# Patient Record
Sex: FEMALE | Race: OTHER | URBAN - METROPOLITAN AREA
[De-identification: names, ages, dates, MRNs, and addresses within clinical notes are randomized per-mention and may not be internally consistent; named-entity substitution may affect disease eponyms.]

---

## 2023-07-28 ENCOUNTER — HOSPITAL ENCOUNTER (INPATIENT)
Age: 38
LOS: 5 days | Discharge: HOME | DRG: 812 | End: 2023-08-02
Attending: EMERGENCY MEDICINE | Admitting: FAMILY MEDICINE

## 2023-07-28 ENCOUNTER — APPOINTMENT (OUTPATIENT)
Dept: CT IMAGING | Age: 38
DRG: 812 | End: 2023-07-28

## 2023-07-28 ENCOUNTER — APPOINTMENT (OUTPATIENT)
Dept: ULTRASOUND IMAGING | Age: 38
DRG: 812 | End: 2023-07-28

## 2023-07-28 DIAGNOSIS — R19.5 OCCULT BLOOD IN STOOLS: ICD-10-CM

## 2023-07-28 DIAGNOSIS — D64.9 ANEMIA, UNSPECIFIED TYPE: Primary | ICD-10-CM

## 2023-07-28 DIAGNOSIS — N76.0 BACTERIAL VAGINOSIS: ICD-10-CM

## 2023-07-28 DIAGNOSIS — B96.89 BACTERIAL VAGINOSIS: ICD-10-CM

## 2023-07-28 DIAGNOSIS — B37.31 VAGINAL YEAST INFECTION: ICD-10-CM

## 2023-07-28 DIAGNOSIS — N30.00 ACUTE CYSTITIS WITHOUT HEMATURIA: ICD-10-CM

## 2023-07-28 LAB
ALBUMIN SERPL-MCNC: 4 G/DL (ref 3.5–5)
ALBUMIN/GLOB SERPL: 0.9 (ref 0.4–1.6)
ALP SERPL-CCNC: 92 U/L (ref 50–136)
ALT SERPL-CCNC: 20 U/L (ref 12–65)
ANION GAP SERPL CALC-SCNC: 5 MMOL/L (ref 2–11)
APPEARANCE UR: CLEAR
AST SERPL-CCNC: 26 U/L (ref 15–37)
BACTERIA URNS QL MICRO: 0 /HPF
BASOPHILS # BLD: 0 K/UL (ref 0–0.2)
BASOPHILS NFR BLD: 1 % (ref 0–2)
BILIRUB SERPL-MCNC: 0.3 MG/DL (ref 0.2–1.1)
BILIRUB UR QL: NEGATIVE
BILIRUB UR QL: NEGATIVE
BUN SERPL-MCNC: 9 MG/DL (ref 6–23)
CALCIUM SERPL-MCNC: 9.5 MG/DL (ref 8.3–10.4)
CASTS URNS QL MICRO: 0 /LPF
CHLORIDE SERPL-SCNC: 108 MMOL/L (ref 101–110)
CO2 SERPL-SCNC: 27 MMOL/L (ref 21–32)
COLOR UR: ABNORMAL
CREAT SERPL-MCNC: 0.7 MG/DL (ref 0.6–1)
CRYSTALS URNS QL MICRO: 0 /LPF
DIFFERENTIAL METHOD BLD: ABNORMAL
EOSINOPHIL # BLD: 0.1 K/UL (ref 0–0.8)
EOSINOPHIL NFR BLD: 2 % (ref 0.5–7.8)
EPI CELLS #/AREA URNS HPF: ABNORMAL /HPF
ERYTHROCYTE [DISTWIDTH] IN BLOOD BY AUTOMATED COUNT: 18.1 % (ref 11.9–14.6)
FERRITIN SERPL-MCNC: 1 NG/ML (ref 8–388)
FOLATE SERPL-MCNC: 18.2 NG/ML (ref 3.1–17.5)
GLOBULIN SER CALC-MCNC: 4.6 G/DL (ref 2.8–4.5)
GLUCOSE SERPL-MCNC: 84 MG/DL (ref 65–100)
GLUCOSE UR QL STRIP.AUTO: NEGATIVE MG/DL
GLUCOSE UR STRIP.AUTO-MCNC: NEGATIVE MG/DL
HCG UR QL: NEGATIVE
HCT VFR BLD AUTO: 29.3 % (ref 35.8–46.3)
HGB BLD-MCNC: 8 G/DL (ref 11.7–15.4)
HGB UR QL STRIP: NEGATIVE
IMM GRANULOCYTES # BLD AUTO: 0 K/UL (ref 0–0.5)
IMM GRANULOCYTES NFR BLD AUTO: 0 % (ref 0–5)
IRON SERPL-MCNC: 12 UG/DL (ref 35–150)
KETONES UR QL STRIP.AUTO: NEGATIVE MG/DL
KETONES UR-MCNC: NEGATIVE MG/DL
LEUKOCYTE ESTERASE UR QL STRIP.AUTO: ABNORMAL
LEUKOCYTE ESTERASE UR QL STRIP: ABNORMAL
LYMPHOCYTES # BLD: 2.5 K/UL (ref 0.5–4.6)
LYMPHOCYTES NFR BLD: 44 % (ref 13–44)
MCH RBC QN AUTO: 16.5 PG (ref 26.1–32.9)
MCHC RBC AUTO-ENTMCNC: 27.3 G/DL (ref 31.4–35)
MCV RBC AUTO: 60.4 FL (ref 82–102)
MONOCYTES # BLD: 0.4 K/UL (ref 0.1–1.3)
MONOCYTES NFR BLD: 7 % (ref 4–12)
MUCOUS THREADS URNS QL MICRO: 0 /LPF
NEUTS SEG # BLD: 2.6 K/UL (ref 1.7–8.2)
NEUTS SEG NFR BLD: 46 % (ref 43–78)
NITRITE UR QL STRIP.AUTO: NEGATIVE
NITRITE UR QL: NEGATIVE
NRBC # BLD: 0 K/UL (ref 0–0.2)
OTHER OBSERVATIONS: ABNORMAL
PH UR STRIP: 5.5 (ref 5–9)
PH UR: 5.5 (ref 5–9)
PLATELET # BLD AUTO: 398 K/UL (ref 150–450)
PMV BLD AUTO: 9.9 FL (ref 9.4–12.3)
POTASSIUM SERPL-SCNC: 4 MMOL/L (ref 3.5–5.1)
PROT SERPL-MCNC: 8.6 G/DL (ref 6.3–8.2)
PROT UR QL: NEGATIVE MG/DL
PROT UR STRIP-MCNC: NEGATIVE MG/DL
RBC # BLD AUTO: 4.85 M/UL (ref 4.05–5.2)
RBC # UR STRIP: NEGATIVE
RBC #/AREA URNS HPF: ABNORMAL /HPF
SERVICE CMNT-IMP: ABNORMAL
SERVICE CMNT-IMP: NORMAL
SODIUM SERPL-SCNC: 140 MMOL/L (ref 133–143)
SP GR UR REFRACTOMETRY: 1.01 (ref 1–1.02)
SP GR UR: 1.02 (ref 1–1.02)
TRANSFERRIN SERPL-MCNC: 393 MG/DL (ref 202–364)
URINE CULTURE IF INDICATED: ABNORMAL
UROBILINOGEN UR QL STRIP.AUTO: 0.2 EU/DL (ref 0.2–1)
UROBILINOGEN UR QL: 0.2 EU/DL (ref 0.2–1)
VIT B12 SERPL-MCNC: 299 PG/ML (ref 193–986)
WBC # BLD AUTO: 5.6 K/UL (ref 4.3–11.1)
WBC URNS QL MICRO: ABNORMAL /HPF
WET PREP GENITAL: NORMAL

## 2023-07-28 PROCEDURE — 87088 URINE BACTERIA CULTURE: CPT

## 2023-07-28 PROCEDURE — 86923 COMPATIBILITY TEST ELECTRIC: CPT

## 2023-07-28 PROCEDURE — 84466 ASSAY OF TRANSFERRIN: CPT

## 2023-07-28 PROCEDURE — 85025 COMPLETE CBC W/AUTO DIFF WBC: CPT

## 2023-07-28 PROCEDURE — 81025 URINE PREGNANCY TEST: CPT

## 2023-07-28 PROCEDURE — 2580000003 HC RX 258: Performed by: FAMILY MEDICINE

## 2023-07-28 PROCEDURE — 81003 URINALYSIS AUTO W/O SCOPE: CPT

## 2023-07-28 PROCEDURE — 6360000004 HC RX CONTRAST MEDICATION

## 2023-07-28 PROCEDURE — 80053 COMPREHEN METABOLIC PANEL: CPT

## 2023-07-28 PROCEDURE — 83010 ASSAY OF HAPTOGLOBIN QUANT: CPT

## 2023-07-28 PROCEDURE — 1100000003 HC PRIVATE W/ TELEMETRY

## 2023-07-28 PROCEDURE — 87491 CHLMYD TRACH DNA AMP PROBE: CPT

## 2023-07-28 PROCEDURE — 74177 CT ABD & PELVIS W/CONTRAST: CPT

## 2023-07-28 PROCEDURE — 86901 BLOOD TYPING SEROLOGIC RH(D): CPT

## 2023-07-28 PROCEDURE — 86850 RBC ANTIBODY SCREEN: CPT

## 2023-07-28 PROCEDURE — 96374 THER/PROPH/DIAG INJ IV PUSH: CPT

## 2023-07-28 PROCEDURE — 81001 URINALYSIS AUTO W/SCOPE: CPT

## 2023-07-28 PROCEDURE — 87591 N.GONORRHOEAE DNA AMP PROB: CPT

## 2023-07-28 PROCEDURE — 82607 VITAMIN B-12: CPT

## 2023-07-28 PROCEDURE — 87186 SC STD MICRODIL/AGAR DIL: CPT

## 2023-07-28 PROCEDURE — 87661 TRICHOMONAS VAGINALIS AMPLIF: CPT

## 2023-07-28 PROCEDURE — 6360000002 HC RX W HCPCS: Performed by: FAMILY MEDICINE

## 2023-07-28 PROCEDURE — 6370000000 HC RX 637 (ALT 250 FOR IP): Performed by: FAMILY MEDICINE

## 2023-07-28 PROCEDURE — 87086 URINE CULTURE/COLONY COUNT: CPT

## 2023-07-28 PROCEDURE — 82746 ASSAY OF FOLIC ACID SERUM: CPT

## 2023-07-28 PROCEDURE — 99285 EMERGENCY DEPT VISIT HI MDM: CPT

## 2023-07-28 PROCEDURE — 76830 TRANSVAGINAL US NON-OB: CPT

## 2023-07-28 PROCEDURE — 96375 TX/PRO/DX INJ NEW DRUG ADDON: CPT

## 2023-07-28 PROCEDURE — 36415 COLL VENOUS BLD VENIPUNCTURE: CPT

## 2023-07-28 PROCEDURE — 86900 BLOOD TYPING SEROLOGIC ABO: CPT

## 2023-07-28 PROCEDURE — 82728 ASSAY OF FERRITIN: CPT

## 2023-07-28 PROCEDURE — 2580000003 HC RX 258

## 2023-07-28 PROCEDURE — 6360000002 HC RX W HCPCS

## 2023-07-28 PROCEDURE — 83540 ASSAY OF IRON: CPT

## 2023-07-28 PROCEDURE — 87210 SMEAR WET MOUNT SALINE/INK: CPT

## 2023-07-28 RX ORDER — ONDANSETRON 2 MG/ML
4 INJECTION INTRAMUSCULAR; INTRAVENOUS EVERY 6 HOURS PRN
Status: DISCONTINUED | OUTPATIENT
Start: 2023-07-28 | End: 2023-08-02 | Stop reason: HOSPADM

## 2023-07-28 RX ORDER — 0.9 % SODIUM CHLORIDE 0.9 %
100 INTRAVENOUS SOLUTION INTRAVENOUS ONCE
Status: COMPLETED | OUTPATIENT
Start: 2023-07-28 | End: 2023-07-28

## 2023-07-28 RX ORDER — SODIUM CHLORIDE 0.9 % (FLUSH) 0.9 %
10 SYRINGE (ML) INJECTION
Status: COMPLETED | OUTPATIENT
Start: 2023-07-28 | End: 2023-07-28

## 2023-07-28 RX ORDER — FLUCONAZOLE 150 MG/1
150 TABLET ORAL ONCE
Qty: 2 TABLET | Refills: 0 | Status: SHIPPED | OUTPATIENT
Start: 2023-07-28 | End: 2023-08-02 | Stop reason: HOSPADM

## 2023-07-28 RX ORDER — ONDANSETRON 2 MG/ML
4 INJECTION INTRAMUSCULAR; INTRAVENOUS
Status: COMPLETED | OUTPATIENT
Start: 2023-07-28 | End: 2023-07-28

## 2023-07-28 RX ORDER — ACETAMINOPHEN 325 MG/1
650 TABLET ORAL EVERY 6 HOURS PRN
Status: DISCONTINUED | OUTPATIENT
Start: 2023-07-28 | End: 2023-08-02 | Stop reason: HOSPADM

## 2023-07-28 RX ORDER — METRONIDAZOLE 500 MG/1
500 TABLET ORAL 2 TIMES DAILY
Qty: 14 TABLET | Refills: 0 | Status: SHIPPED | OUTPATIENT
Start: 2023-07-28 | End: 2023-08-02 | Stop reason: HOSPADM

## 2023-07-28 RX ORDER — ACETAMINOPHEN 650 MG/1
650 SUPPOSITORY RECTAL EVERY 6 HOURS PRN
Status: DISCONTINUED | OUTPATIENT
Start: 2023-07-28 | End: 2023-08-02 | Stop reason: HOSPADM

## 2023-07-28 RX ORDER — CEPHALEXIN 500 MG/1
500 CAPSULE ORAL 2 TIMES DAILY
Qty: 14 CAPSULE | Refills: 0 | Status: SHIPPED | OUTPATIENT
Start: 2023-07-28 | End: 2023-08-02 | Stop reason: HOSPADM

## 2023-07-28 RX ORDER — POLYETHYLENE GLYCOL 3350 17 G/17G
17 POWDER, FOR SOLUTION ORAL DAILY PRN
Status: DISCONTINUED | OUTPATIENT
Start: 2023-07-28 | End: 2023-08-02 | Stop reason: HOSPADM

## 2023-07-28 RX ORDER — KETOROLAC TROMETHAMINE 30 MG/ML
30 INJECTION, SOLUTION INTRAMUSCULAR; INTRAVENOUS
Status: COMPLETED | OUTPATIENT
Start: 2023-07-28 | End: 2023-07-28

## 2023-07-28 RX ORDER — SODIUM CHLORIDE 0.9 % (FLUSH) 0.9 %
5-40 SYRINGE (ML) INJECTION EVERY 12 HOURS SCHEDULED
Status: DISCONTINUED | OUTPATIENT
Start: 2023-07-28 | End: 2023-08-02 | Stop reason: HOSPADM

## 2023-07-28 RX ORDER — FLUCONAZOLE 100 MG/1
200 TABLET ORAL ONCE
Status: COMPLETED | OUTPATIENT
Start: 2023-07-28 | End: 2023-07-28

## 2023-07-28 RX ORDER — FERROUS SULFATE 325(65) MG
325 TABLET ORAL
Qty: 90 TABLET | Refills: 1 | Status: SHIPPED | OUTPATIENT
Start: 2023-07-28 | End: 2023-08-02 | Stop reason: HOSPADM

## 2023-07-28 RX ORDER — SODIUM CHLORIDE 9 MG/ML
INJECTION, SOLUTION INTRAVENOUS PRN
Status: DISCONTINUED | OUTPATIENT
Start: 2023-07-28 | End: 2023-08-02 | Stop reason: HOSPADM

## 2023-07-28 RX ORDER — SODIUM CHLORIDE 0.9 % (FLUSH) 0.9 %
5-40 SYRINGE (ML) INJECTION PRN
Status: DISCONTINUED | OUTPATIENT
Start: 2023-07-28 | End: 2023-08-02 | Stop reason: HOSPADM

## 2023-07-28 RX ORDER — DOCUSATE SODIUM 100 MG/1
100 CAPSULE, LIQUID FILLED ORAL 2 TIMES DAILY
Qty: 60 CAPSULE | Refills: 0 | Status: SHIPPED | OUTPATIENT
Start: 2023-07-28 | End: 2023-08-02 | Stop reason: HOSPADM

## 2023-07-28 RX ORDER — ONDANSETRON 4 MG/1
4 TABLET, ORALLY DISINTEGRATING ORAL EVERY 8 HOURS PRN
Status: DISCONTINUED | OUTPATIENT
Start: 2023-07-28 | End: 2023-08-02 | Stop reason: HOSPADM

## 2023-07-28 RX ORDER — METRONIDAZOLE 500 MG/1
500 TABLET ORAL EVERY 12 HOURS SCHEDULED
Status: DISCONTINUED | OUTPATIENT
Start: 2023-07-28 | End: 2023-08-02

## 2023-07-28 RX ADMIN — SODIUM CHLORIDE 100 ML: 9 INJECTION, SOLUTION INTRAVENOUS at 14:56

## 2023-07-28 RX ADMIN — FLUCONAZOLE 200 MG: 100 TABLET ORAL at 22:02

## 2023-07-28 RX ADMIN — ONDANSETRON 4 MG: 2 INJECTION INTRAMUSCULAR; INTRAVENOUS at 12:30

## 2023-07-28 RX ADMIN — METRONIDAZOLE 500 MG: 500 TABLET ORAL at 22:02

## 2023-07-28 RX ADMIN — SODIUM CHLORIDE, PRESERVATIVE FREE 5 ML: 5 INJECTION INTRAVENOUS at 22:20

## 2023-07-28 RX ADMIN — SODIUM CHLORIDE, PRESERVATIVE FREE 10 ML: 5 INJECTION INTRAVENOUS at 14:56

## 2023-07-28 RX ADMIN — IOPAMIDOL 100 ML: 755 INJECTION, SOLUTION INTRAVENOUS at 14:56

## 2023-07-28 RX ADMIN — KETOROLAC TROMETHAMINE 30 MG: 30 INJECTION, SOLUTION INTRAMUSCULAR at 12:30

## 2023-07-28 RX ADMIN — ONDANSETRON 4 MG: 2 INJECTION INTRAMUSCULAR; INTRAVENOUS at 22:20

## 2023-07-28 RX ADMIN — ACETAMINOPHEN 650 MG: 325 TABLET ORAL at 22:20

## 2023-07-28 ASSESSMENT — PAIN SCALES - GENERAL
PAINLEVEL_OUTOF10: 8
PAINLEVEL_OUTOF10: 8
PAINLEVEL_OUTOF10: 3
PAINLEVEL_OUTOF10: 3

## 2023-07-28 ASSESSMENT — ENCOUNTER SYMPTOMS
SHORTNESS OF BREATH: 0
COUGH: 0

## 2023-07-28 ASSESSMENT — PAIN DESCRIPTION - DESCRIPTORS: DESCRIPTORS: ACHING

## 2023-07-28 ASSESSMENT — PAIN - FUNCTIONAL ASSESSMENT: PAIN_FUNCTIONAL_ASSESSMENT: 0-10

## 2023-07-28 ASSESSMENT — LIFESTYLE VARIABLES
HOW OFTEN DO YOU HAVE A DRINK CONTAINING ALCOHOL: NEVER
HOW MANY STANDARD DRINKS CONTAINING ALCOHOL DO YOU HAVE ON A TYPICAL DAY: PATIENT DOES NOT DRINK

## 2023-07-28 ASSESSMENT — PAIN DESCRIPTION - LOCATION
LOCATION: HEAD
LOCATION: HEAD

## 2023-07-28 NOTE — ED NOTES
TRANSFER - OUT REPORT:    Verbal report given to Keith Oliver RN on [facility]  being transferred to  for routine progression of patient care       Report consisted of patient's Situation, Background, Assessment and   Recommendations(SBAR). Information from the following report(s) ED Encounter Summary was reviewed with the receiving nurse. [place] Assessment:    Presents to emergency department  because of falls (Syncope, seizure, or loss of consciousness): No  Age > 70: No  Altered Mental Status, Intoxication with alcohol or substance confusion (Disorientation, impaired judgment, poor safety awaremess, or inability to follow instructions): No  Impaired Mobility: Ambulates or transfers with assistive devices or assistance; Unable to ambulate or transer.: No  Nursing Judgement: No          Lines:   Peripheral IV 07/28/23 Right Antecubital (Active)   Site Assessment Clean, dry & intact 07/28/23 1227   Line Status Blood return noted; Flushed 07/28/23 1227   Phlebitis Assessment No symptoms 07/28/23 1227        Opportunity for questions and clarification was provided.       Patient transported with:  Celestino Leventhal, RN  07/28/23 9037

## 2023-07-28 NOTE — DISCHARGE INSTRUCTIONS
Advised to follow-up with the gynecologist and primary care physician at the earliest.  Advised to eat foods rich in iron. Advised not to do any strenuous work/activity. Benadryl p.o. as needed for any allergic reaction.

## 2023-07-28 NOTE — CONSENT
Informed Consent for Blood Component Transfusion Note    I have discussed with the patient the rationale for blood component transfusion; its benefits in treating or preventing fatigue, organ damage, or death; and its risk which includes mild transfusion reactions, rare risk of blood borne infection, or more serious but rare reactions. I have discussed the alternatives to transfusion, including the risk and consequences of not receiving transfusion. The patient had an opportunity to ask questions and had agreed to proceed with transfusion of blood components.     Electronically signed by Reji Hogan DO on 7/28/23 at 5:25 PM EDT

## 2023-07-28 NOTE — ED NOTES
Pt to ed  used, for vaginal burning, itching, and foul odor. Pt states this has been going on for the past two months. Discharge is white/clear in color. Pt states urinary urgency but reports dysuria as well. Pt states she is sexually active, denies protection, states tubal ligation.       Hilda Sinclair RN  07/28/23 9113

## 2023-07-28 NOTE — H&P
images reconstructed at 5 mm intervals were obtained from above the diaphragms through the ischial tuberosities following 100 cc Isovue-370 without acute complication. All CT scans performed at this facility use one or all of the following: Automated exposure control, adjustment of the mA and/or kVp according to patient's size, iterative reconstruction. Findings: CT ABDOMEN:  Limited evaluation of the lung bases and base of the mediastinum demonstrates no significant abnormalities. The Liver is homogeneous in attenuation. The spleen is homogeneous in attenuation. No contour deforming or enhancing mass lesions are seen of the pancreas or adrenal glands. The gallbladder has an unremarkable CT appearance without radiopaque stones or pericholecystic fluid/inflammatory changes. The kidneys enhance symmetrically and no evidence of hydronephrosis is seen. A 6 mm low-attenuation intracortical lesion is seen in the midpole of the left kidney which is too small to characterize particular given its intracortical location although demonstrates no clear suspicious enhancement favoring a small cyst. Assessment for subtle peritoneal changes is somewhat limited by patient motion artifact. The visualized loops of small bowel and colon are normal in caliber. The appendix is seen on axial image 54 without acute abnormality. No free fluid, free air, or focal inflammatory changes are seen in the abdomen. No adenopathy is seen. The abdominal aorta is unremarkable in appearance. CT PELVIS: No abnormal pelvic fluid collections or inflammatory changes are present. No pelvic adenopathy is seen. The urinary bladder shows no clear abnormality although may be mildly thick-walled. .     1.  Mildly motion limited study. No suspicious acute changes are seen. Only questionable mild wall thickening of the urinary bladder is seen which could be seen with cystitis. Recommend clinical correlation.     US PELVIS COMPLETE NON-OB TRANSABDOMINAL

## 2023-07-28 NOTE — ED PROVIDER NOTES
I spoke with the patient through the use of a . We reviewed her work-up here in the emergency department and I talked to her about my concerns over her anemia with positive fecal occult blood. The patient however feels like her heavy menstrual cycles may be more to blame. She will be given referral to GYN for outpatient follow-up. We will also get additional blood work looking at iron deficiency as a possible cause of her anemia. The patient will be started on a daily iron tablet and was encouraged to increase the fruits and vegetables or start taking a stool softener to prevent constipation. Return criteria were discussed with her at length.            Prudence Hector,   07/28/23 4174
Initiate Hypoglycemia Treatment    Up with assistance    Place intermittent pneumatic compression device    Telemetry monitoring - 24 hour duration    Full code    Inpatient consult to GI    Initiate Oxygen Therapy Protocol    POCT Urinalysis no Micro    TYPE AND SCREEN    ADMIT TO INPATIENT        Medications   metroNIDAZOLE (FLAGYL) tablet 500 mg (has no administration in time range)   sodium chloride flush 0.9 % injection 5-40 mL (has no administration in time range)   sodium chloride flush 0.9 % injection 5-40 mL (has no administration in time range)   0.9 % sodium chloride infusion (has no administration in time range)   ondansetron (ZOFRAN-ODT) disintegrating tablet 4 mg (has no administration in time range)     Or   ondansetron (ZOFRAN) injection 4 mg (has no administration in time range)   polyethylene glycol (GLYCOLAX) packet 17 g (has no administration in time range)   acetaminophen (TYLENOL) tablet 650 mg (has no administration in time range)     Or   acetaminophen (TYLENOL) suppository 650 mg (has no administration in time range)   fluconazole (DIFLUCAN) tablet 200 mg (has no administration in time range)   ketorolac (TORADOL) injection 30 mg (30 mg IntraVENous Given 7/28/23 1230)   ondansetron (ZOFRAN) injection 4 mg (4 mg IntraVENous Given 7/28/23 1230)   iopamidol (ISOVUE-370) 76 % injection 100 mL (100 mLs IntraVENous Given 7/28/23 1456)   sodium chloride flush 0.9 % injection 10 mL (10 mLs IntraVENous Given 7/28/23 1456)   sodium chloride 0.9 % bolus 100 mL (0 mLs IntraVENous Stopped 7/28/23 1516)       Current Discharge Medication List        START taking these medications    Details   cephALEXin (KEFLEX) 500 MG capsule Take 1 capsule by mouth 2 times daily for 7 days  Qty: 14 capsule, Refills: 0      ferrous sulfate (IRON 325) 325 (65 Fe) MG tablet Take 1 tablet by mouth daily (with breakfast)  Qty: 90 tablet, Refills: 1      docusate sodium (COLACE) 100 MG capsule Take 1 capsule by mouth 2 times

## 2023-07-28 NOTE — PROGRESS NOTES
TRANSFER - IN REPORT:    Verbal report received from [address] [place] [address], [address] [place] [address] on [facility]  being received from ED for routine progression of patient care      Report consisted of patient's Situation, Background, Assessment and   Recommendations(SBAR). Information from the following report(s) Nurse Handoff Report was reviewed with the receiving nurse. Opportunity for questions and clarification was provided. Assessment completed upon patient's arrival to unit and care assumed.

## 2023-07-29 LAB
ANION GAP SERPL CALC-SCNC: 5 MMOL/L (ref 2–11)
ANION GAP SERPL CALC-SCNC: 8 MMOL/L (ref 2–11)
APTT PPP: 38.1 SEC (ref 24.5–34.2)
BASOPHILS # BLD: 0.1 K/UL (ref 0–0.2)
BASOPHILS NFR BLD: 1 % (ref 0–2)
BUN SERPL-MCNC: 10 MG/DL (ref 6–23)
BUN SERPL-MCNC: 8 MG/DL (ref 6–23)
CALCIUM SERPL-MCNC: 8.9 MG/DL (ref 8.3–10.4)
CALCIUM SERPL-MCNC: 9.2 MG/DL (ref 8.3–10.4)
CHLORIDE SERPL-SCNC: 110 MMOL/L (ref 101–110)
CHLORIDE SERPL-SCNC: 112 MMOL/L (ref 101–110)
CO2 SERPL-SCNC: 22 MMOL/L (ref 21–32)
CO2 SERPL-SCNC: 26 MMOL/L (ref 21–32)
CREAT SERPL-MCNC: 0.7 MG/DL (ref 0.6–1)
CREAT SERPL-MCNC: 0.9 MG/DL (ref 0.6–1)
DIFFERENTIAL METHOD BLD: ABNORMAL
EOSINOPHIL # BLD: 0.1 K/UL (ref 0–0.8)
EOSINOPHIL NFR BLD: 1 % (ref 0.5–7.8)
ERYTHROCYTE [DISTWIDTH] IN BLOOD BY AUTOMATED COUNT: 18.1 % (ref 11.9–14.6)
ERYTHROCYTE [DISTWIDTH] IN BLOOD BY AUTOMATED COUNT: 19.7 % (ref 11.9–14.6)
GLUCOSE BLD STRIP.AUTO-MCNC: 119 MG/DL (ref 65–100)
GLUCOSE BLD STRIP.AUTO-MCNC: 98 MG/DL (ref 65–100)
GLUCOSE SERPL-MCNC: 146 MG/DL (ref 65–100)
GLUCOSE SERPL-MCNC: 90 MG/DL (ref 65–100)
HAPTOGLOB SERPL-MCNC: 139 MG/DL (ref 30–200)
HCT VFR BLD AUTO: 28.2 % (ref 35.8–46.3)
HCT VFR BLD AUTO: 36.4 % (ref 35.8–46.3)
HGB BLD-MCNC: 10.1 G/DL (ref 11.7–15.4)
HGB BLD-MCNC: 7.8 G/DL (ref 11.7–15.4)
IMM GRANULOCYTES # BLD AUTO: 0 K/UL (ref 0–0.5)
IMM GRANULOCYTES NFR BLD AUTO: 0 % (ref 0–5)
INR PPP: 1.1
LACTATE SERPL-SCNC: 2.3 MMOL/L (ref 0.4–2)
LACTATE SERPL-SCNC: 3.5 MMOL/L (ref 0.4–2)
LYMPHOCYTES # BLD: 6.1 K/UL (ref 0.5–4.6)
LYMPHOCYTES NFR BLD: 59 % (ref 13–44)
MAGNESIUM SERPL-MCNC: 2.2 MG/DL (ref 1.8–2.4)
MCH RBC QN AUTO: 16.4 PG (ref 26.1–32.9)
MCH RBC QN AUTO: 16.6 PG (ref 26.1–32.9)
MCHC RBC AUTO-ENTMCNC: 27.7 G/DL (ref 31.4–35)
MCHC RBC AUTO-ENTMCNC: 27.7 G/DL (ref 31.4–35)
MCV RBC AUTO: 59.2 FL (ref 82–102)
MCV RBC AUTO: 60.1 FL (ref 82–102)
MONOCYTES # BLD: 0.7 K/UL (ref 0.1–1.3)
MONOCYTES NFR BLD: 7 % (ref 4–12)
NEUTS SEG # BLD: 3.3 K/UL (ref 1.7–8.2)
NEUTS SEG NFR BLD: 32 % (ref 43–78)
NRBC # BLD: 0 K/UL (ref 0–0.2)
NRBC # BLD: 0 K/UL (ref 0–0.2)
PLATELET # BLD AUTO: 328 K/UL (ref 150–450)
PLATELET # BLD AUTO: 487 K/UL (ref 150–450)
PLATELET COMMENT: ADEQUATE
PMV BLD AUTO: 9.6 FL (ref 9.4–12.3)
PMV BLD AUTO: 9.6 FL (ref 9.4–12.3)
POTASSIUM SERPL-SCNC: 3.4 MMOL/L (ref 3.5–5.1)
POTASSIUM SERPL-SCNC: 3.9 MMOL/L (ref 3.5–5.1)
PROTHROMBIN TIME: 14.3 SEC (ref 12.6–14.3)
RBC # BLD AUTO: 4.69 M/UL (ref 4.05–5.2)
RBC # BLD AUTO: 6.15 M/UL (ref 4.05–5.2)
RBC MORPH BLD: ABNORMAL
SERVICE CMNT-IMP: ABNORMAL
SERVICE CMNT-IMP: NORMAL
SODIUM SERPL-SCNC: 141 MMOL/L (ref 133–143)
SODIUM SERPL-SCNC: 142 MMOL/L (ref 133–143)
WBC # BLD AUTO: 10.3 K/UL (ref 4.3–11.1)
WBC # BLD AUTO: 4.3 K/UL (ref 4.3–11.1)
WBC MORPH BLD: ABNORMAL

## 2023-07-29 PROCEDURE — 94760 N-INVAS EAR/PLS OXIMETRY 1: CPT

## 2023-07-29 PROCEDURE — 6370000000 HC RX 637 (ALT 250 FOR IP): Performed by: FAMILY MEDICINE

## 2023-07-29 PROCEDURE — 2580000003 HC RX 258: Performed by: FAMILY MEDICINE

## 2023-07-29 PROCEDURE — 6360000002 HC RX W HCPCS: Performed by: FAMILY MEDICINE

## 2023-07-29 PROCEDURE — 85025 COMPLETE CBC W/AUTO DIFF WBC: CPT

## 2023-07-29 PROCEDURE — 2000000000 HC ICU R&B

## 2023-07-29 PROCEDURE — 2700000000 HC OXYGEN THERAPY PER DAY

## 2023-07-29 PROCEDURE — 85027 COMPLETE CBC AUTOMATED: CPT

## 2023-07-29 PROCEDURE — 80048 BASIC METABOLIC PNL TOTAL CA: CPT

## 2023-07-29 PROCEDURE — 85730 THROMBOPLASTIN TIME PARTIAL: CPT

## 2023-07-29 PROCEDURE — 83735 ASSAY OF MAGNESIUM: CPT

## 2023-07-29 PROCEDURE — 99252 IP/OBS CONSLTJ NEW/EST SF 35: CPT | Performed by: INTERNAL MEDICINE

## 2023-07-29 PROCEDURE — 36415 COLL VENOUS BLD VENIPUNCTURE: CPT

## 2023-07-29 PROCEDURE — 82962 GLUCOSE BLOOD TEST: CPT

## 2023-07-29 PROCEDURE — 85610 PROTHROMBIN TIME: CPT

## 2023-07-29 PROCEDURE — 83605 ASSAY OF LACTIC ACID: CPT

## 2023-07-29 RX ORDER — DIPHENHYDRAMINE HYDROCHLORIDE 50 MG/ML
25 INJECTION INTRAMUSCULAR; INTRAVENOUS ONCE
Status: COMPLETED | OUTPATIENT
Start: 2023-07-29 | End: 2023-07-29

## 2023-07-29 RX ORDER — EPINEPHRINE IN SOD CHLOR,ISO 1 MG/10 ML
SYRINGE (ML) INTRAVENOUS
Status: COMPLETED | OUTPATIENT
Start: 2023-07-29 | End: 2023-07-29

## 2023-07-29 RX ORDER — SODIUM CHLORIDE, SODIUM LACTATE, POTASSIUM CHLORIDE, AND CALCIUM CHLORIDE .6; .31; .03; .02 G/100ML; G/100ML; G/100ML; G/100ML
500 INJECTION, SOLUTION INTRAVENOUS ONCE
Status: DISCONTINUED | OUTPATIENT
Start: 2023-07-29 | End: 2023-07-29

## 2023-07-29 RX ORDER — PANTOPRAZOLE SODIUM 40 MG/1
40 TABLET, DELAYED RELEASE ORAL
Status: DISCONTINUED | OUTPATIENT
Start: 2023-07-29 | End: 2023-07-30

## 2023-07-29 RX ORDER — 0.9 % SODIUM CHLORIDE 0.9 %
1000 INTRAVENOUS SOLUTION INTRAVENOUS ONCE
Status: COMPLETED | OUTPATIENT
Start: 2023-07-29 | End: 2023-07-29

## 2023-07-29 RX ORDER — SODIUM CHLORIDE, SODIUM LACTATE, POTASSIUM CHLORIDE, AND CALCIUM CHLORIDE .6; .31; .03; .02 G/100ML; G/100ML; G/100ML; G/100ML
1000 INJECTION, SOLUTION INTRAVENOUS ONCE
Status: COMPLETED | OUTPATIENT
Start: 2023-07-29 | End: 2023-07-29

## 2023-07-29 RX ORDER — EPINEPHRINE 1 MG/ML
INJECTION, SOLUTION, CONCENTRATE INTRAVENOUS
Status: COMPLETED | OUTPATIENT
Start: 2023-07-29 | End: 2023-07-29

## 2023-07-29 RX ORDER — SODIUM CHLORIDE, SODIUM LACTATE, POTASSIUM CHLORIDE, CALCIUM CHLORIDE 600; 310; 30; 20 MG/100ML; MG/100ML; MG/100ML; MG/100ML
INJECTION, SOLUTION INTRAVENOUS CONTINUOUS
Status: ACTIVE | OUTPATIENT
Start: 2023-07-29 | End: 2023-07-30

## 2023-07-29 RX ORDER — EPINEPHRINE 1 MG/ML
0.5 INJECTION, SOLUTION, CONCENTRATE INTRAVENOUS ONCE
Status: DISCONTINUED | OUTPATIENT
Start: 2023-07-29 | End: 2023-08-02 | Stop reason: HOSPADM

## 2023-07-29 RX ORDER — EPINEPHRINE 1 MG/ML
0.5 INJECTION, SOLUTION, CONCENTRATE INTRAVENOUS AS NEEDED
Status: DISCONTINUED | OUTPATIENT
Start: 2023-07-29 | End: 2023-08-02 | Stop reason: HOSPADM

## 2023-07-29 RX ADMIN — EPINEPHRINE 0.5 MG: 1 INJECTION, SOLUTION, CONCENTRATE INTRAVENOUS at 12:42

## 2023-07-29 RX ADMIN — SODIUM CHLORIDE 250 MG: 9 INJECTION, SOLUTION INTRAVENOUS at 10:00

## 2023-07-29 RX ADMIN — SODIUM CHLORIDE, POTASSIUM CHLORIDE, SODIUM LACTATE AND CALCIUM CHLORIDE 1000 ML: 600; 310; 30; 20 INJECTION, SOLUTION INTRAVENOUS at 18:36

## 2023-07-29 RX ADMIN — DIPHENHYDRAMINE HYDROCHLORIDE 25 MG: 50 INJECTION, SOLUTION INTRAMUSCULAR; INTRAVENOUS at 12:20

## 2023-07-29 RX ADMIN — PANTOPRAZOLE SODIUM 40 MG: 40 TABLET, DELAYED RELEASE ORAL at 09:10

## 2023-07-29 RX ADMIN — METRONIDAZOLE 500 MG: 500 TABLET ORAL at 09:10

## 2023-07-29 RX ADMIN — METRONIDAZOLE 500 MG: 500 TABLET ORAL at 21:36

## 2023-07-29 RX ADMIN — ACETAMINOPHEN 650 MG: 325 TABLET ORAL at 18:38

## 2023-07-29 RX ADMIN — SODIUM CHLORIDE, PRESERVATIVE FREE 10 ML: 5 INJECTION INTRAVENOUS at 09:11

## 2023-07-29 RX ADMIN — WATER 80 MG: 1 INJECTION INTRAMUSCULAR; INTRAVENOUS; SUBCUTANEOUS at 12:19

## 2023-07-29 RX ADMIN — SODIUM CHLORIDE 1000 ML: 900 INJECTION, SOLUTION INTRAVENOUS at 13:00

## 2023-07-29 RX ADMIN — ONDANSETRON 4 MG: 2 INJECTION INTRAMUSCULAR; INTRAVENOUS at 22:26

## 2023-07-29 RX ADMIN — SODIUM CHLORIDE, PRESERVATIVE FREE 10 ML: 5 INJECTION INTRAVENOUS at 21:38

## 2023-07-29 RX ADMIN — SODIUM CHLORIDE, SODIUM LACTATE, POTASSIUM CHLORIDE, AND CALCIUM CHLORIDE: 600; 310; 30; 20 INJECTION, SOLUTION INTRAVENOUS at 17:29

## 2023-07-29 RX ADMIN — ONDANSETRON 4 MG: 2 INJECTION INTRAMUSCULAR; INTRAVENOUS at 12:25

## 2023-07-29 RX ADMIN — EPINEPHRINE 0.4 MG: 0.1 INJECTION INTRACARDIAC; INTRAVENOUS at 12:26

## 2023-07-29 ASSESSMENT — PAIN SCALES - GENERAL
PAINLEVEL_OUTOF10: 2
PAINLEVEL_OUTOF10: 0
PAINLEVEL_OUTOF10: 0

## 2023-07-29 ASSESSMENT — PAIN DESCRIPTION - LOCATION: LOCATION: BACK

## 2023-07-29 ASSESSMENT — PAIN DESCRIPTION - DESCRIPTORS: DESCRIPTORS: SORE

## 2023-07-29 NOTE — SIGNIFICANT EVENT
Hospitalist Rapid Response or Critical Care Event   Admit Date:  2023 11:26 AM   Name:  Kalpesh Rosales   Age:  30 y.o. Sex:  female  :  1985   MRN:  [id number]   Room:  [facility]/    Presenting Complaint: Vaginal Pain and Dysuria    Reason(s) for Admission: Vaginal yeast infection [B37.31]  Occult blood in stools [R19.5]  Bacterial vaginosis [N76.0, B96.89]  Acute cystitis without hematuria [N30.00]  Symptomatic anemia [D64.9]  Anemia, unspecified type [D64.9]     Rapid Response or Critical Care Event:   Went to assess patient at bedside routinely-no prior indications/suspicions for hemodynamic instability. Per nursing staff everything has been going well prior to my arrival.    At bedside patient endorsed abdominal pain and had right sided facial swelling. Noted IV iron had finished infusing. Immediately went to nursing staff and instructed her to turn off the IV iron. Per nursing staff she had tolerated this well until my arrival.  Blood pressure was obtained and initially hypertensive 139/109. Discussed with nursing staff concern for anaphylaxis. Instructed to give IV methylprednisolone 80 mg once and IV Benadryl 25 mg once. Instructed them to get epinephrine ready at the bedside as well, preferably IM. Recycled blood pressure 70/51. Sinus tachycardia on the monitor. Instructed nursing staff to get IV epinephrine ready. Instructed for fluid boluses as well. Patient initially AOx4, nonfocal neurologic exam suddenly becoming less responsive. Blood pressure improved to SBP >100 after first dose of epinephrine. Fluids continuing to run and having received the first round of medications as above. Mentation transiently improved. Blood pressure again relapsed and became hypotensive 60-70/50s. She had a pulse throughout and remained in sinus tachycardia. Second round of epinephrine administered this time IM. Patient remained on monitor throughout.  Airway remained patent throughout

## 2023-07-29 NOTE — PROGRESS NOTES
If we can be of any service to you during your stay please let us know      Dave Butcher     [phone]

## 2023-07-29 NOTE — PROGRESS NOTES
Responded to rapid response    Staff was at bedside    Patient is [place]     present      Will follow up after code

## 2023-07-29 NOTE — PROGRESS NOTES
Hospitalist Progress Note   Admit Date:  2023 11:26 AM   Name:  Patience Dietrich   Age:  30 y.o. Sex:  female  :  1985   MRN:  [id number]   Room:  [facility]/    Presenting/Chief Complaint: Vaginal Pain and Dysuria     Reason(s) for Admission: Vaginal yeast infection [B37.31]  Occult blood in stools [R19.5]  Bacterial vaginosis [N76.0, B96.89]  Acute cystitis without hematuria [N30.00]  Symptomatic anemia [D64.9]  Anemia, unspecified type [D64.9]     Hospital Course:   Patience Dietrich is a 30 y.o. female who presented to the ED for cc vaginal itching and fatigue with light headedness. No past medical hx other than tubal ligation. Denies taking any medications. Hg noted to be 8 in ER and stool occult +. Does endorse Hx of heavy periods that last 8 days with last period stopping on . GI evaluated patient. No signs of overt GI bleed. Recommended electrive EGD & colonoscopy after discharge. Patient received IV Ferric gluconate. Anaphylactic reaction; no prior Hx of allergies per patient and none documented. Transferred to [address] [place] [address] significant event note. Hgb now 10 on repeat. Subjective & 24hr Events:   See significant event note    ROS  Pertinent positives:  As outlined above PLUS see significant event note    Pertinent negatives:  Bloody or black stools, Dysuria, and Urinary frequency  Assessment & Plan:   Patience Dietrich is a 30 y.o. female who presented to the ED for cc vaginal itching and fatigue with light headedness. No past medical hx other than tubal ligation. Denies taking any medications. Hg noted to be 8 in ER and stool occult +. Does endorse Hx of heavy periods that last 8 days with last period stopping on .       See significant event note for updated A+P regarding new anaphylaxis    Microcytic Anemia  Initially presenting with vaginal irritation, incidentally found to have hgb of 8.0 with no baseline for comparison  Hemodynamically stable with no active

## 2023-07-29 NOTE — PROGRESS NOTES
TRANSFER - OUT REPORT:    Verbal report given to [address] on [facility]  being transferred to ICU for routine progression of patient care       Report consisted of patient's Situation, Background, Assessment and   Recommendations(SBAR). Information from the following report(s) Nurse Handoff Report, Index, Intake/Output, MAR, Recent Results, and Event Log was reviewed with the receiving nurse. Lines:   Peripheral IV 07/28/23 Right Antecubital (Active)   Site Assessment Clean, dry & intact 07/29/23 0757   Line Status Flushed;Blood return noted;Capped 07/29/23 [address] [facility] [address] changed; Connections checked and tightened 07/29/23 0757   Phlebitis Assessment No symptoms 07/29/23 0757   Infiltration Assessment 0 07/29/23 0757   Alcohol Cap Used Yes 07/29/23 0757   Dressing Status Clean, dry & intact 07/29/23 0757   Dressing Type Transparent 07/29/23 0757        Opportunity for questions and clarification was provided.       Patient transported with:  Registered Nurse

## 2023-07-29 NOTE — PROGRESS NOTES
Medication administration, assessment, and admission database completed with language services  Krysta Conte #[id number].

## 2023-07-29 NOTE — CONSULTS
HPI:  30 y.o. female who presented to the ED for cc vaginal itching and fatigue with light headedness. No past medical hx other than tubal ligation. Denies taking any medications. Hg noted to be 8 in ER and stool occult +  Does admit to heavy periods that last 8 days with last period stopping on 7/24. Hg 8. I have no comparison. ROS:    HPI      Physical Exam:    General:          Well nourished. Patient is alert and oriented. She is lying flat in bed. No shortness of breath. She appears weak. She is answering questions well. Head:               Normocephalic, atraumatic  Eyes:               Sclerae appear normal.  Pupils equally round. ENT:                Nares appear normal.  Moist oral mucosa  Neck:               No restricted ROM. Trachea midline   CV:                  RRR. No m/r/g. No jugular venous distension. Lungs:             CTAB. No wheezing, rhonchi, or rales. Symmetric expansion. Abdomen:        Soft, NT ND, no guarding. Extremities:     No cyanosis or clubbing. No edema  Skin:                No rashes and normal coloration. Warm and dry. Neuro:             CN II-XII grossly intact. Sensation intact. Psych:             Normal mood and affect. Labs, Imaging reviewed    Impression:    Symptomatic anemia likely multifactorial.      Plan:    No signs of overt GI bleeding. Recommend elective EGD colonoscopy after discharge. Follow with Dr Sabrina Magana on discharge.

## 2023-07-29 NOTE — ICUWATCH
Rapid Response Team Note      Subjective: Responded to Rapid Response secondary to anaphylaxis. Summary: Patient was found by attending provider to be having an anaphylactic reaction while receiving iron infusion. Orders for 0.5mg epinephrine IM received. While awaiting dose from pharmacy, patient had drop in BP. Given 0.4 mg epinephrine IV per attending provider, with rebound of BP. BP dropped once more while arranging ICU transfer and patient was given 0.5 mg epinephrine IM. NSR/S. Tach with unlabored respirations and SaO2 greater than 94% throughout RR. Patient transferred to ICU for continued care. See Rapid Response/Code Blue Narrator for full documentation    Outcome: Patient transferring to critical care.     Aram Dixon RN  [place]: [address]: [phone]

## 2023-07-29 NOTE — PROGRESS NOTES
TRANSFER - IN REPORT:    Verbal report received from [facility]  being received from 284 for routine progression of patient care      Report consisted of patient's Situation, Background, Assessment and   Recommendations(SBAR). Information from the following report(s) Nurse Handoff Report, Index, ED SBAR, Adult Overview, Surgery Report, Intake/Output, Recent Results, Med Rec Status, Cardiac Rhythm Sinus Tach, Procedure Verification, and Quality Measures was reviewed with the receiving nurse. Opportunity for questions and clarification was provided. Assessment completed upon patient's arrival to unit and care assumed.

## 2023-07-29 NOTE — PROGRESS NOTES
Rapid response called patient received 1x Zofran, benadryl, epi, prednisone all scanned in mar. Icu rover, attending, resp, house sup all at bedside pt transfer order placed.

## 2023-07-29 NOTE — PROGRESS NOTES
Writer notified by cna, and attending physician new onset swollen lip, itching, and burning. Ferric gluconate stopped, flushed with 10 cc saline, new orders placed for possible allergic reaction

## 2023-07-30 PROBLEM — T78.2XXA ANAPHYLAXIS: Status: ACTIVE | Noted: 2023-07-30

## 2023-07-30 PROBLEM — N39.0 UTI (URINARY TRACT INFECTION): Status: ACTIVE | Noted: 2023-07-30

## 2023-07-30 LAB
ANION GAP SERPL CALC-SCNC: 5 MMOL/L (ref 2–11)
BACTERIA SPEC CULT: ABNORMAL
BASOPHILS # BLD: 0 K/UL (ref 0–0.2)
BASOPHILS NFR BLD: 0 % (ref 0–2)
BUN SERPL-MCNC: 7 MG/DL (ref 6–23)
CALCIUM SERPL-MCNC: 8.6 MG/DL (ref 8.3–10.4)
CHLORIDE SERPL-SCNC: 117 MMOL/L (ref 101–110)
CO2 SERPL-SCNC: 22 MMOL/L (ref 21–32)
CREAT SERPL-MCNC: 0.5 MG/DL (ref 0.6–1)
DIFFERENTIAL METHOD BLD: ABNORMAL
EOSINOPHIL # BLD: 0 K/UL (ref 0–0.8)
EOSINOPHIL NFR BLD: 0 % (ref 0.5–7.8)
ERYTHROCYTE [DISTWIDTH] IN BLOOD BY AUTOMATED COUNT: 18.2 % (ref 11.9–14.6)
GLUCOSE SERPL-MCNC: 126 MG/DL (ref 65–100)
HCT VFR BLD AUTO: 24.2 % (ref 35.8–46.3)
HCT VFR BLD AUTO: 24.9 % (ref 35.8–46.3)
HGB BLD-MCNC: 6.8 G/DL (ref 11.7–15.4)
HGB BLD-MCNC: 7 G/DL (ref 11.7–15.4)
HISTORY CHECK: NORMAL
IMM GRANULOCYTES # BLD AUTO: 0.1 K/UL (ref 0–0.5)
IMM GRANULOCYTES NFR BLD AUTO: 1 % (ref 0–5)
LACTATE SERPL-SCNC: 1.1 MMOL/L (ref 0.4–2)
LYMPHOCYTES # BLD: 0.8 K/UL (ref 0.5–4.6)
LYMPHOCYTES NFR BLD: 6 % (ref 13–44)
MCH RBC QN AUTO: 16.8 PG (ref 26.1–32.9)
MCHC RBC AUTO-ENTMCNC: 28.1 G/DL (ref 31.4–35)
MCV RBC AUTO: 59.9 FL (ref 82–102)
MONOCYTES # BLD: 0.4 K/UL (ref 0.1–1.3)
MONOCYTES NFR BLD: 3 % (ref 4–12)
NEUTS SEG # BLD: 12.4 K/UL (ref 1.7–8.2)
NEUTS SEG NFR BLD: 90 % (ref 43–78)
NRBC # BLD: 0 K/UL (ref 0–0.2)
PLATELET # BLD AUTO: 305 K/UL (ref 150–450)
PMV BLD AUTO: 10.1 FL (ref 9.4–12.3)
POTASSIUM SERPL-SCNC: 4 MMOL/L (ref 3.5–5.1)
RBC # BLD AUTO: 4.04 M/UL (ref 4.05–5.2)
SERVICE CMNT-IMP: ABNORMAL
SODIUM SERPL-SCNC: 144 MMOL/L (ref 133–143)
WBC # BLD AUTO: 13.8 K/UL (ref 4.3–11.1)

## 2023-07-30 PROCEDURE — 6370000000 HC RX 637 (ALT 250 FOR IP): Performed by: FAMILY MEDICINE

## 2023-07-30 PROCEDURE — 85025 COMPLETE CBC W/AUTO DIFF WBC: CPT

## 2023-07-30 PROCEDURE — 85018 HEMOGLOBIN: CPT

## 2023-07-30 PROCEDURE — 6360000002 HC RX W HCPCS: Performed by: FAMILY MEDICINE

## 2023-07-30 PROCEDURE — 36415 COLL VENOUS BLD VENIPUNCTURE: CPT

## 2023-07-30 PROCEDURE — 83605 ASSAY OF LACTIC ACID: CPT

## 2023-07-30 PROCEDURE — 1100000000 HC RM PRIVATE

## 2023-07-30 PROCEDURE — 2580000003 HC RX 258: Performed by: FAMILY MEDICINE

## 2023-07-30 PROCEDURE — 99231 SBSQ HOSP IP/OBS SF/LOW 25: CPT | Performed by: INTERNAL MEDICINE

## 2023-07-30 PROCEDURE — 85014 HEMATOCRIT: CPT

## 2023-07-30 PROCEDURE — 80048 BASIC METABOLIC PNL TOTAL CA: CPT

## 2023-07-30 RX ORDER — FAMOTIDINE 20 MG/1
20 TABLET, FILM COATED ORAL 2 TIMES DAILY
Status: DISCONTINUED | OUTPATIENT
Start: 2023-07-30 | End: 2023-08-02 | Stop reason: HOSPADM

## 2023-07-30 RX ORDER — SODIUM CHLORIDE 9 MG/ML
INJECTION, SOLUTION INTRAVENOUS PRN
Status: DISCONTINUED | OUTPATIENT
Start: 2023-07-30 | End: 2023-07-30

## 2023-07-30 RX ADMIN — FAMOTIDINE 20 MG: 20 TABLET ORAL at 21:26

## 2023-07-30 RX ADMIN — METHYLPREDNISOLONE SODIUM SUCCINATE 40 MG: 40 INJECTION, POWDER, LYOPHILIZED, FOR SOLUTION INTRAMUSCULAR; INTRAVENOUS at 18:04

## 2023-07-30 RX ADMIN — SODIUM CHLORIDE, PRESERVATIVE FREE 10 ML: 5 INJECTION INTRAVENOUS at 21:37

## 2023-07-30 RX ADMIN — METRONIDAZOLE 500 MG: 500 TABLET ORAL at 21:26

## 2023-07-30 RX ADMIN — METRONIDAZOLE 500 MG: 500 TABLET ORAL at 12:22

## 2023-07-30 RX ADMIN — METHYLPREDNISOLONE SODIUM SUCCINATE 40 MG: 40 INJECTION, POWDER, LYOPHILIZED, FOR SOLUTION INTRAMUSCULAR; INTRAVENOUS at 13:15

## 2023-07-30 RX ADMIN — SODIUM CHLORIDE, PRESERVATIVE FREE 10 ML: 5 INJECTION INTRAVENOUS at 09:00

## 2023-07-30 RX ADMIN — FAMOTIDINE 20 MG: 20 TABLET ORAL at 12:22

## 2023-07-30 ASSESSMENT — PAIN SCALES - GENERAL: PAINLEVEL_OUTOF10: 0

## 2023-07-30 NOTE — CONSULTS
No episodes GI bleeding noted since admission. Unlikely anemia is due GI bleed. Refusing PRBC transfusions per RN. ROS:    HPI      Physical Exam:    General:          Well nourished. Patient is alert and oriented. She is lying flat in bed. No shortness of breath. She appears weak. She is answering questions well. Head:               Normocephalic, atraumatic  Eyes:               Sclerae appear normal.  Pupils equally round. ENT:                Nares appear normal.  Moist oral mucosa  Neck:               No restricted ROM. Trachea midline   CV:                  RRR. No m/r/g. No jugular venous distension. Lungs:             CTAB. No wheezing, rhonchi, or rales. Symmetric expansion. Abdomen:        Soft, NT, no guarding,  ND. Extremities:     No cyanosis or clubbing. No edema  Skin:                No rashes and normal coloration. Warm and dry. Neuro:             CN II-XII grossly intact. Sensation intact. Psych:             Normal mood and affect. Labs, Imaging reviewed    Impression:    Anemia      Plan:     Transfuse to 7. Outpt EGD colonoscopy. Follow Dr Andrew Wahl on discharge. GI will signoff.

## 2023-07-30 NOTE — PROGRESS NOTES
Hospitalist Progress Note   Admit Date:  2023 11:26 AM   Name:  Ilan Ramos   Age:  30 y.o. Sex:  female  :  1985   MRN:  [id number]   Room:  [facility]/    Presenting/Chief Complaint: Vaginal Pain and Dysuria     Reason(s) for Admission: Vaginal yeast infection [B37.31]  Occult blood in stools [R19.5]  Bacterial vaginosis [N76.0, B96.89]  Acute cystitis without hematuria [N30.00]  Symptomatic anemia [D64.9]  Anemia, unspecified type [D64.9]     Hospital Course:   Ilan Ramos is a 30 y.o. female who presented to the ED for cc vaginal itching and fatigue with light headedness. No past medical hx other than tubal ligation. Denies taking any medications. Hg noted to be 8 in ER and stool occult +. Does endorse Hx of heavy periods that last 8 days with last period stopping on . GI evaluated patient. No signs of overt GI bleed. Recommended electrive EGD & colonoscopy after discharge. Patient received IV Ferric gluconate. Anaphylactic reaction; no prior Hx of allergies per patient and none documented. Transferred to [facility], allergic reaction to Iron Infusion. Hgb now 10 on repeat. Subjective & 24hr Events:   Spoke to pt with Interpretor  C/o mild soreness of the lips= mild swollen  but better from before, started on solumedrol iv  C/o mild headache, says similar to her normal migraine headache  Repeated HB/HCT , HB OF 7. Discussed about blood transfusion- said ok for blood transfusion. Will give transfusion tomorrow.   Later on today normal lactic acid   Urine culture E.coli and strep  Advanced to regular diet      Assessment & Plan:     - Microcytic anemia - Iron deficiency anemia prob secondary to menorrhagia and pt is also  Occult blood positive at admission  Hb 7  Took consent for transfusion but will transfuse in am- 2023  I have discussed with the patient the rationale for blood component transfusion; its benefits in treating or preventing fatigue, organ

## 2023-07-30 NOTE — PROGRESS NOTES
Patient with hgb of 6.8. Dr. Laura Carr notified. She was going to order a transfusion but patient declined having blood at this time. She is anxious about having transfusion and would like to speak with provider about other options.

## 2023-07-30 NOTE — PROGRESS NOTES
TRANSFER - IN REPORT:    Verbal report received from [address] [place] [address] Rd on [facility]  being received from ICU for routine progression of patient care      Report consisted of patient's Situation, Background, Assessment and   Recommendations(SBAR). Information from the following report(s) Nurse Handoff Report, Index, Adult Overview, Surgery Report, Intake/Output, MAR, and Recent Results was reviewed with the receiving nurse. Opportunity for questions and clarification was provided. Assessment completed upon patient's arrival to unit and care assumed.

## 2023-07-31 LAB
BASOPHILS # BLD: 0 K/UL (ref 0–0.2)
BASOPHILS NFR BLD: 0 % (ref 0–2)
DIFFERENTIAL METHOD BLD: ABNORMAL
EKG ATRIAL RATE: 125 BPM
EKG DIAGNOSIS: NORMAL
EKG P AXIS: 68 DEGREES
EKG P-R INTERVAL: 160 MS
EKG Q-T INTERVAL: 308 MS
EKG QRS DURATION: 82 MS
EKG QTC CALCULATION (BAZETT): 444 MS
EKG R AXIS: 31 DEGREES
EKG T AXIS: 44 DEGREES
EKG VENTRICULAR RATE: 125 BPM
EOSINOPHIL # BLD: 0 K/UL (ref 0–0.8)
EOSINOPHIL NFR BLD: 0 % (ref 0.5–7.8)
ERYTHROCYTE [DISTWIDTH] IN BLOOD BY AUTOMATED COUNT: 18.8 % (ref 11.9–14.6)
HCT VFR BLD AUTO: 24.8 % (ref 35.8–46.3)
HGB BLD-MCNC: 7 G/DL (ref 11.7–15.4)
HISTORY CHECK: NORMAL
IMM GRANULOCYTES # BLD AUTO: 0.1 K/UL (ref 0–0.5)
IMM GRANULOCYTES NFR BLD AUTO: 1 % (ref 0–5)
LYMPHOCYTES # BLD: 0.9 K/UL (ref 0.5–4.6)
LYMPHOCYTES NFR BLD: 7 % (ref 13–44)
MCH RBC QN AUTO: 16.9 PG (ref 26.1–32.9)
MCHC RBC AUTO-ENTMCNC: 28.2 G/DL (ref 31.4–35)
MCV RBC AUTO: 60 FL (ref 82–102)
MONOCYTES # BLD: 0.1 K/UL (ref 0.1–1.3)
MONOCYTES NFR BLD: 1 % (ref 4–12)
NEUTS SEG # BLD: 13 K/UL (ref 1.7–8.2)
NEUTS SEG NFR BLD: 92 % (ref 43–78)
NRBC # BLD: 0 K/UL (ref 0–0.2)
PLATELET # BLD AUTO: 275 K/UL (ref 150–450)
PMV BLD AUTO: 9.9 FL (ref 9.4–12.3)
RBC # BLD AUTO: 4.13 M/UL (ref 4.05–5.2)
WBC # BLD AUTO: 14.1 K/UL (ref 4.3–11.1)

## 2023-07-31 PROCEDURE — 36415 COLL VENOUS BLD VENIPUNCTURE: CPT

## 2023-07-31 PROCEDURE — 1100000003 HC PRIVATE W/ TELEMETRY

## 2023-07-31 PROCEDURE — 6360000002 HC RX W HCPCS: Performed by: FAMILY MEDICINE

## 2023-07-31 PROCEDURE — 2580000003 HC RX 258: Performed by: FAMILY MEDICINE

## 2023-07-31 PROCEDURE — 93005 ELECTROCARDIOGRAM TRACING: CPT | Performed by: FAMILY MEDICINE

## 2023-07-31 PROCEDURE — 6370000000 HC RX 637 (ALT 250 FOR IP): Performed by: HOSPITALIST

## 2023-07-31 PROCEDURE — 93010 ELECTROCARDIOGRAM REPORT: CPT | Performed by: INTERNAL MEDICINE

## 2023-07-31 PROCEDURE — 85025 COMPLETE CBC W/AUTO DIFF WBC: CPT

## 2023-07-31 PROCEDURE — 6370000000 HC RX 637 (ALT 250 FOR IP): Performed by: FAMILY MEDICINE

## 2023-07-31 RX ORDER — SODIUM CHLORIDE 9 MG/ML
INJECTION, SOLUTION INTRAVENOUS PRN
Status: DISCONTINUED | OUTPATIENT
Start: 2023-07-31 | End: 2023-07-31

## 2023-07-31 RX ORDER — DIPHENHYDRAMINE HYDROCHLORIDE 50 MG/ML
25 INJECTION INTRAMUSCULAR; INTRAVENOUS EVERY 6 HOURS PRN
Status: DISCONTINUED | OUTPATIENT
Start: 2023-07-31 | End: 2023-07-31

## 2023-07-31 RX ORDER — DIPHENHYDRAMINE HCL 25 MG
25 CAPSULE ORAL EVERY 6 HOURS PRN
Status: DISCONTINUED | OUTPATIENT
Start: 2023-07-31 | End: 2023-08-02 | Stop reason: HOSPADM

## 2023-07-31 RX ADMIN — CEFTRIAXONE 1000 MG: 1 INJECTION, POWDER, FOR SOLUTION INTRAMUSCULAR; INTRAVENOUS at 09:55

## 2023-07-31 RX ADMIN — SODIUM CHLORIDE, PRESERVATIVE FREE 10 ML: 5 INJECTION INTRAVENOUS at 09:55

## 2023-07-31 RX ADMIN — DIPHENHYDRAMINE HYDROCHLORIDE 25 MG: 25 CAPSULE ORAL at 21:46

## 2023-07-31 RX ADMIN — WATER 125 MG: 1 INJECTION INTRAMUSCULAR; INTRAVENOUS; SUBCUTANEOUS at 11:14

## 2023-07-31 RX ADMIN — METHYLPREDNISOLONE SODIUM SUCCINATE 40 MG: 40 INJECTION, POWDER, LYOPHILIZED, FOR SOLUTION INTRAMUSCULAR; INTRAVENOUS at 05:18

## 2023-07-31 RX ADMIN — FAMOTIDINE 20 MG: 20 TABLET ORAL at 20:36

## 2023-07-31 RX ADMIN — DIPHENHYDRAMINE HYDROCHLORIDE 25 MG: 50 INJECTION, SOLUTION INTRAMUSCULAR; INTRAVENOUS at 11:14

## 2023-07-31 RX ADMIN — WATER 40 MG: 1 INJECTION INTRAMUSCULAR; INTRAVENOUS; SUBCUTANEOUS at 16:12

## 2023-07-31 RX ADMIN — METRONIDAZOLE 500 MG: 500 TABLET ORAL at 20:36

## 2023-07-31 RX ADMIN — WATER 40 MG: 1 INJECTION INTRAMUSCULAR; INTRAVENOUS; SUBCUTANEOUS at 21:37

## 2023-07-31 RX ADMIN — EPINEPHRINE 0.5 MG: 1 INJECTION, SOLUTION, CONCENTRATE INTRAVENOUS at 11:19

## 2023-07-31 RX ADMIN — ACETAMINOPHEN 650 MG: 325 TABLET ORAL at 20:42

## 2023-07-31 RX ADMIN — SODIUM CHLORIDE, PRESERVATIVE FREE 10 ML: 5 INJECTION INTRAVENOUS at 20:44

## 2023-07-31 RX ADMIN — METRONIDAZOLE 500 MG: 500 TABLET ORAL at 09:43

## 2023-07-31 RX ADMIN — METHYLPREDNISOLONE SODIUM SUCCINATE 40 MG: 40 INJECTION, POWDER, LYOPHILIZED, FOR SOLUTION INTRAMUSCULAR; INTRAVENOUS at 00:24

## 2023-07-31 RX ADMIN — FAMOTIDINE 20 MG: 20 TABLET ORAL at 09:43

## 2023-07-31 ASSESSMENT — PAIN DESCRIPTION - DESCRIPTORS: DESCRIPTORS: ACHING

## 2023-07-31 ASSESSMENT — PAIN DESCRIPTION - ORIENTATION: ORIENTATION: RIGHT;LEFT;MID;ANTERIOR

## 2023-07-31 ASSESSMENT — PAIN SCALES - GENERAL
PAINLEVEL_OUTOF10: 4
PAINLEVEL_OUTOF10: 4

## 2023-07-31 ASSESSMENT — PAIN DESCRIPTION - LOCATION
LOCATION: HEAD
LOCATION: HEAD

## 2023-07-31 ASSESSMENT — PAIN - FUNCTIONAL ASSESSMENT: PAIN_FUNCTIONAL_ASSESSMENT: ACTIVITIES ARE NOT PREVENTED

## 2023-07-31 NOTE — PROGRESS NOTES
After patient finishing receiving dose of rocephin antibiotics,  at bedside calls to report she is experiencing lip numbness, swelling and redness around lips. When approached she is tossing back and forth and reports burning sensation to BLE, she moves her R leg and bends it at knee level but L leg does not move even though she thinks she is moving it. Dr Britney Hooks called to bedside. Symptoms reported were feelings of burning sensation to chest area, abdomen, and nausea. Reports L leg feeling heavy and unable to raise off bed but able to move sideways. When asked about symptoms compared to yesterday episode, she reports feeling the same. Benadry, solumedrol and epi shot was administered. After a few minutes, patient stated feeling better. Dr Britney Hooks present the whole time. Patient still reports feeling weakness on her legs, but burning sensation has disappeared. Denies chest pain of SOB, vital signs have remained WNL through the whole episode. Resting in bed now in no distress.

## 2023-07-31 NOTE — PROGRESS NOTES
Hospitalist Progress Note   Admit Date:  2023 11:26 AM   Name:  Melecio Reynoso   Age:  30 y.o. Sex:  female  :  1985   MRN:  [id number]   Room:      Presenting/Chief Complaint: Vaginal Pain and Dysuria     Reason(s) for Admission: Vaginal yeast infection [B37.31]  Occult blood in stools [R19.5]  Bacterial vaginosis [N76.0, B96.89]  Acute cystitis without hematuria [N30.00]  Symptomatic anemia [D64.9]  Anemia, unspecified type [D64.9]     Hospital Course:   Melecio Reynoso is a 30 y.o. female who presented to the ED for cc vaginal itching and fatigue with light headedness. No past medical hx other than tubal ligation. Denies taking any medications. Hg noted to be 8 in ER and stool occult +. Does endorse Hx of heavy periods that last 8 days with last period stopping on . GI evaluated patient. No signs of overt GI bleed. Recommended electrive EGD & colonoscopy after discharge. Patient received IV Ferric gluconate. Anaphylactic reaction; no prior Hx of allergies per patient and none documented. Transferred to [facility], allergic reaction to Iron Infusion. Hgb now 10 on repeat. Subjective & 24hr Events:     Spoke to pt with Interpretor  C/o mild soreness of the lips= mild swollen  but better from before, started on solumedrol iv  C/o mild headache, says similar to her normal migraine headache  Repeated HB/HCT , HB OF 7. Discussed about blood transfusion- said ok for blood transfusion. Will give transfusion tomorrow. Later on today normal lactic acid   Urine culture E.coli and strep  Advanced to regular diet      Says doing fine, no more lip swelling, was started on Rocephin today for UTI. Mild elevated white blood count probably secondary to steroids. Hemoglobin at 7.     Later on today patient developed allergic reaction to Rocephin, finished a dose of Rocephin, noticed erythema to the lower part of the face, complaining of lip itching, complaining of

## 2023-07-31 NOTE — CARE COORDINATION
Chart screened by  for discharge planning. No needs identified at this time. Please consult  if any new issues arise. 07/28/23 6314   Service Assessment   Patient Orientation Alert and Oriented   Cognition Alert   History Provided By Medical Record   Primary Caregiver Self   Support Systems Spouse/Significant Other;Children;Family Members   PCP Verified by CM Yes  (pt does not have a PCP)   Prior Functional Level Independent in ADLs/IADLs   Current Functional Level Other (see comment)  (TBD by clinical team)   Can patient return to prior living arrangement Yes   Ability to make needs known: Good   Family able to assist with home care needs: Yes   Would you like for me to discuss the discharge plan with any other family members/significant others, and if so, who?  No   Discharge Planning   Type of Residence House   Living Arrangements Spouse/Significant Other;Children   Current Services Prior To Admission None   Potential Assistance Needed N/A   Type of Home Care Services None   Patient expects to be discharged to: House   Condition of Participation: Discharge Planning   The Plan for Transition of Care is related to the following treatment goals: Anticipate return home

## 2023-07-31 NOTE — PROGRESS NOTES
Patient calls to report feeling \"strange again\" she reports mild swelling feeling to lower lip and tingling/burning sensation to R leg, plantar area. She reports not severe but they are still present. VSS. On telementry on NSR.

## 2023-07-31 NOTE — PROGRESS NOTES
Patient/caregivers speak Guatemalan as their preferred language for their healthcare communication. For safe communication, use the [facility]  carts or call:    Daria Marroquin   Senior -Navigator [phone]  General phone: [phone] ( [phone])  Email: [email or web address]    Please always document the use of interpreting services ('s ID number) in your clinical notes. Our interpreters are available for team members working with limited  [place] proficient (LEP) patients remotely, via phone or video or in person (if needed for special cases).

## 2023-08-01 LAB
ABO + RH BLD: NORMAL
ANION GAP SERPL CALC-SCNC: 6 MMOL/L (ref 2–11)
BASOPHILS # BLD: 0 K/UL (ref 0–0.2)
BASOPHILS NFR BLD: 0 % (ref 0–2)
BLD PROD TYP BPU: NORMAL
BLOOD BANK DISPENSE STATUS: NORMAL
BLOOD GROUP ANTIBODIES SERPL: NORMAL
BPU ID: NORMAL
BUN SERPL-MCNC: 11 MG/DL (ref 6–23)
C TRACH RRNA SPEC QL NAA+PROBE: NEGATIVE
CALCIUM SERPL-MCNC: 9 MG/DL (ref 8.3–10.4)
CHLORIDE SERPL-SCNC: 111 MMOL/L (ref 101–110)
CO2 SERPL-SCNC: 26 MMOL/L (ref 21–32)
CREAT SERPL-MCNC: 0.6 MG/DL (ref 0.6–1)
CROSSMATCH RESULT: NORMAL
DIFFERENTIAL METHOD BLD: ABNORMAL
EOSINOPHIL # BLD: 0 K/UL (ref 0–0.8)
EOSINOPHIL NFR BLD: 0 % (ref 0.5–7.8)
ERYTHROCYTE [DISTWIDTH] IN BLOOD BY AUTOMATED COUNT: 19.9 % (ref 11.9–14.6)
GLUCOSE SERPL-MCNC: 122 MG/DL (ref 65–100)
HCT VFR BLD AUTO: 26.1 % (ref 35.8–46.3)
HGB BLD-MCNC: 7.3 G/DL (ref 11.7–15.4)
IMM GRANULOCYTES # BLD AUTO: 0.1 K/UL (ref 0–0.5)
IMM GRANULOCYTES NFR BLD AUTO: 1 % (ref 0–5)
LYMPHOCYTES # BLD: 1.1 K/UL (ref 0.5–4.6)
LYMPHOCYTES NFR BLD: 8 % (ref 13–44)
MCH RBC QN AUTO: 17 PG (ref 26.1–32.9)
MCHC RBC AUTO-ENTMCNC: 28 G/DL (ref 31.4–35)
MCV RBC AUTO: 60.7 FL (ref 82–102)
MONOCYTES # BLD: 0.4 K/UL (ref 0.1–1.3)
MONOCYTES NFR BLD: 3 % (ref 4–12)
N GONORRHOEA RRNA SPEC QL NAA+PROBE: NEGATIVE
NEUTS SEG # BLD: 12.2 K/UL (ref 1.7–8.2)
NEUTS SEG NFR BLD: 89 % (ref 43–78)
NRBC # BLD: 0 K/UL (ref 0–0.2)
PLATELET # BLD AUTO: 270 K/UL (ref 150–450)
PMV BLD AUTO: 9.9 FL (ref 9.4–12.3)
POTASSIUM SERPL-SCNC: 3.9 MMOL/L (ref 3.5–5.1)
RBC # BLD AUTO: 4.3 M/UL (ref 4.05–5.2)
SODIUM SERPL-SCNC: 143 MMOL/L (ref 133–143)
SPECIMEN EXP DATE BLD: NORMAL
SPECIMEN SOURCE: NORMAL
T VAGINALIS RRNA SPEC QL NAA+PROBE: NEGATIVE
UNIT DIVISION: 0
WBC # BLD AUTO: 13.8 K/UL (ref 4.3–11.1)

## 2023-08-01 PROCEDURE — 80048 BASIC METABOLIC PNL TOTAL CA: CPT

## 2023-08-01 PROCEDURE — 36415 COLL VENOUS BLD VENIPUNCTURE: CPT

## 2023-08-01 PROCEDURE — 6370000000 HC RX 637 (ALT 250 FOR IP): Performed by: HOSPITALIST

## 2023-08-01 PROCEDURE — 6370000000 HC RX 637 (ALT 250 FOR IP): Performed by: FAMILY MEDICINE

## 2023-08-01 PROCEDURE — 2580000003 HC RX 258: Performed by: FAMILY MEDICINE

## 2023-08-01 PROCEDURE — 85025 COMPLETE CBC W/AUTO DIFF WBC: CPT

## 2023-08-01 PROCEDURE — 1100000003 HC PRIVATE W/ TELEMETRY

## 2023-08-01 PROCEDURE — 6360000002 HC RX W HCPCS: Performed by: FAMILY MEDICINE

## 2023-08-01 RX ORDER — SODIUM CHLORIDE 9 MG/ML
INJECTION, SOLUTION INTRAVENOUS CONTINUOUS
Status: DISCONTINUED | OUTPATIENT
Start: 2023-08-01 | End: 2023-08-02

## 2023-08-01 RX ORDER — CIPROFLOXACIN 500 MG/1
500 TABLET, FILM COATED ORAL EVERY 12 HOURS SCHEDULED
Status: DISCONTINUED | OUTPATIENT
Start: 2023-08-01 | End: 2023-08-01

## 2023-08-01 RX ORDER — SULFAMETHOXAZOLE AND TRIMETHOPRIM 800; 160 MG/1; MG/1
1 TABLET ORAL EVERY 12 HOURS SCHEDULED
Status: DISCONTINUED | OUTPATIENT
Start: 2023-08-01 | End: 2023-08-02

## 2023-08-01 RX ADMIN — SODIUM CHLORIDE: 9 INJECTION, SOLUTION INTRAVENOUS at 11:16

## 2023-08-01 RX ADMIN — FAMOTIDINE 20 MG: 20 TABLET ORAL at 20:02

## 2023-08-01 RX ADMIN — WATER 40 MG: 1 INJECTION INTRAMUSCULAR; INTRAVENOUS; SUBCUTANEOUS at 17:24

## 2023-08-01 RX ADMIN — SODIUM CHLORIDE, PRESERVATIVE FREE 10 ML: 5 INJECTION INTRAVENOUS at 11:16

## 2023-08-01 RX ADMIN — SODIUM CHLORIDE: 9 INJECTION, SOLUTION INTRAVENOUS at 17:43

## 2023-08-01 RX ADMIN — METRONIDAZOLE 500 MG: 500 TABLET ORAL at 09:02

## 2023-08-01 RX ADMIN — METRONIDAZOLE 500 MG: 500 TABLET ORAL at 20:02

## 2023-08-01 RX ADMIN — WATER 40 MG: 1 INJECTION INTRAMUSCULAR; INTRAVENOUS; SUBCUTANEOUS at 11:12

## 2023-08-01 RX ADMIN — FAMOTIDINE 20 MG: 20 TABLET ORAL at 09:02

## 2023-08-01 RX ADMIN — SODIUM CHLORIDE, PRESERVATIVE FREE 10 ML: 5 INJECTION INTRAVENOUS at 20:04

## 2023-08-01 RX ADMIN — SULFAMETHOXAZOLE AND TRIMETHOPRIM 1 TABLET: 800; 160 TABLET ORAL at 20:01

## 2023-08-01 RX ADMIN — WATER 40 MG: 1 INJECTION INTRAMUSCULAR; INTRAVENOUS; SUBCUTANEOUS at 03:39

## 2023-08-01 RX ADMIN — WATER 40 MG: 1 INJECTION INTRAMUSCULAR; INTRAVENOUS; SUBCUTANEOUS at 21:42

## 2023-08-01 RX ADMIN — DIPHENHYDRAMINE HYDROCHLORIDE 25 MG: 25 CAPSULE ORAL at 20:02

## 2023-08-01 RX ADMIN — SULFAMETHOXAZOLE AND TRIMETHOPRIM 1 TABLET: 800; 160 TABLET ORAL at 11:16

## 2023-08-01 RX ADMIN — SODIUM CHLORIDE: 9 INJECTION, SOLUTION INTRAVENOUS at 20:37

## 2023-08-01 NOTE — PROGRESS NOTES
Hospitalist Progress Note   Admit Date:  2023 11:26 AM   Name:  Yvette Phillips   Age:  30 y.o. Sex:  female  :  1985   MRN:  [id number]   Room:      Presenting/Chief Complaint: Vaginal Pain and Dysuria     Reason(s) for Admission: Vaginal yeast infection [B37.31]  Occult blood in stools [R19.5]  Bacterial vaginosis [N76.0, B96.89]  Acute cystitis without hematuria [N30.00]  Symptomatic anemia [D64.9]  Anemia, unspecified type [D64.9]     Hospital Course:   Yvette Phillips is a 30 y.o. female who presented to the ED for cc vaginal itching and fatigue with light headedness. No past medical hx other than tubal ligation. Denies taking any medications. Hg noted to be 8 in ER and stool occult +. Does endorse Hx of heavy periods that last 8 days with last period stopping on . GI evaluated patient. No signs of overt GI bleed. Recommended electrive EGD & colonoscopy after discharge. Patient received IV Ferric gluconate. Anaphylactic reaction; no prior Hx of allergies per patient and none documented. [place] [facility] [place] Infusion. Hgb now 10 on repeat. Subjective & 24hr Events:     Spoke to pt with Interpretor  C/o mild soreness of the lips= mild swollen  but better from before, started on solumedrol iv  C/o mild headache, says similar to her normal migraine headache  Repeated HB/HCT , HB OF 7. Discussed about blood transfusion- said ok for blood transfusion. Will give transfusion tomorrow. Later on today normal lactic acid   Urine culture E.coli and strep  Advanced to regular diet      Says doing fine, no more lip swelling, was started on Rocephin today for UTI. Mild elevated white blood count probably secondary to steroids. Hemoglobin at 7.     Later on today patient developed allergic reaction to Rocephin, finished a dose of Rocephin, noticed erythema to the lower part of the face, complaining of lip itching, complaining of

## 2023-08-01 NOTE — PROGRESS NOTES
Patient called writer to the room @ 2115 c/o tingling and swelling of the lips and burning at the bottom of the feet (bilaterally). Flagyl, tylenol, and Pecid administered at 2035. Patient states it is not as bad as earlier, however she is very frightened. Hospitalist notified. New order received for 25mg of PO Benadryl PRN q6, and to d/c the IV at this time. 2230: Patient resting comfortably and states all issues have resolved.

## 2023-08-02 VITALS
WEIGHT: 121.25 LBS | OXYGEN SATURATION: 99 % | HEART RATE: 67 BPM | HEIGHT: 61 IN | TEMPERATURE: 97.7 F | SYSTOLIC BLOOD PRESSURE: 104 MMHG | BODY MASS INDEX: 22.89 KG/M2 | DIASTOLIC BLOOD PRESSURE: 65 MMHG | RESPIRATION RATE: 19 BRPM

## 2023-08-02 LAB
ANION GAP SERPL CALC-SCNC: 5 MMOL/L (ref 2–11)
BUN SERPL-MCNC: 8 MG/DL (ref 6–23)
CALCIUM SERPL-MCNC: 8.6 MG/DL (ref 8.3–10.4)
CHLORIDE SERPL-SCNC: 109 MMOL/L (ref 101–110)
CO2 SERPL-SCNC: 25 MMOL/L (ref 21–32)
CREAT SERPL-MCNC: 0.6 MG/DL (ref 0.6–1)
GLUCOSE SERPL-MCNC: 117 MG/DL (ref 65–100)
MAGNESIUM SERPL-MCNC: 2 MG/DL (ref 1.8–2.4)
POTASSIUM SERPL-SCNC: 3.8 MMOL/L (ref 3.5–5.1)
SODIUM SERPL-SCNC: 139 MMOL/L (ref 133–143)

## 2023-08-02 PROCEDURE — 2580000003 HC RX 258: Performed by: FAMILY MEDICINE

## 2023-08-02 PROCEDURE — 80048 BASIC METABOLIC PNL TOTAL CA: CPT

## 2023-08-02 PROCEDURE — 6370000000 HC RX 637 (ALT 250 FOR IP): Performed by: FAMILY MEDICINE

## 2023-08-02 PROCEDURE — 6360000002 HC RX W HCPCS: Performed by: FAMILY MEDICINE

## 2023-08-02 PROCEDURE — 36415 COLL VENOUS BLD VENIPUNCTURE: CPT

## 2023-08-02 PROCEDURE — 83735 ASSAY OF MAGNESIUM: CPT

## 2023-08-02 RX ORDER — SULFAMETHOXAZOLE AND TRIMETHOPRIM 800; 160 MG/1; MG/1
1 TABLET ORAL ONCE
Status: COMPLETED | OUTPATIENT
Start: 2023-08-02 | End: 2023-08-02

## 2023-08-02 RX ORDER — DIPHENHYDRAMINE HCL 25 MG
25 CAPSULE ORAL EVERY 6 HOURS PRN
Qty: 12 CAPSULE | Refills: 0 | Status: SHIPPED | OUTPATIENT
Start: 2023-08-02 | End: 2023-08-05

## 2023-08-02 RX ADMIN — FAMOTIDINE 20 MG: 20 TABLET ORAL at 09:30

## 2023-08-02 RX ADMIN — WATER 40 MG: 1 INJECTION INTRAMUSCULAR; INTRAVENOUS; SUBCUTANEOUS at 03:19

## 2023-08-02 RX ADMIN — SULFAMETHOXAZOLE AND TRIMETHOPRIM 1 TABLET: 800; 160 TABLET ORAL at 09:30

## 2023-08-02 RX ADMIN — SODIUM CHLORIDE, PRESERVATIVE FREE 10 ML: 5 INJECTION INTRAVENOUS at 09:30

## 2023-08-02 RX ADMIN — SODIUM CHLORIDE: 9 INJECTION, SOLUTION INTRAVENOUS at 06:50

## 2023-08-02 RX ADMIN — SULFAMETHOXAZOLE AND TRIMETHOPRIM 1 TABLET: 800; 160 TABLET ORAL at 16:30

## 2023-08-02 NOTE — DISCHARGE SUMMARY
07/28/2023 12:12 PM    LABCAST 0 07/28/2023 12:12 PM    MUCUS 0 07/28/2023 12:12 PM        Microbiology:  Results       Procedure Component Value Units Date/Time    Wet prep, genital [[id number]] Collected: 07/28/23 1212    Order Status: Completed Specimen: Vaginal Updated: 07/28/23 1255     Special Requests NO SPECIAL REQUESTS        Wet Prep --        1 TO 9   WBCS SEEN  PER HPF       Wet Prep --        MANY  YEAST       Wet Prep --        FEW  CLUE CELLS PRESENT       Wet Prep NO TRICHOMONAS SEEN       Chlamydia, Gonorrhea, Trichomoniasis Swab [[id number]] Collected: 07/28/23 1212    Order Status: Completed Specimen: Urine Updated: 08/01/23 2136     Sample Site URINE        Chlamydia trachomatis, YSABEL Negative        Neisseria Gonorrhoeae, YSABEL Negative        Comment: (NOTE)  Performed At: [facility] &  [address] [place], [place] [id number]  Valery De Jesus MD FD:[id number]          Trichomonas Vaginalis by YSABEL Negative        Comment: (NOTE)  Performed At: [facility] & [address] [id number]  Valery De Jesus MD TH:[id number]         Culture, Urine [[id number]]  (Abnormal)  (Susceptibility) Collected: 07/28/23 1212    Order Status: Completed Specimen: Urine Updated: 07/30/23 0919     Special Requests --        NO SPECIAL REQUESTS  Reflexed from [id number]       Culture       10,000 to 50,000 COLONIES/mL Escherichia coli                  10,000 to 50,000 COLONIES/mL MIXED SKIN JILLIAN ISOLATED                  INCLUDING STREPTOCOCCI, BETA HEMOLYTIC GROUP B Group B Streptococci remain universally susceptible to  Penicillin, Ampicillin, and Cefazolin. Resistance to Clindamycin and Erythromycin can occur. Please contact laboratory within 7 days of collection if susceptibility testing is needed. The number of Streptococci Group B is not suggestive of a urinary tract infection.   However, if this patient is pregnant, the presence of any amount of Streptococci Group B may

## 2023-08-02 NOTE — PLAN OF CARE
Problem: Discharge Planning  Goal: Discharge to home or other facility with appropriate resources  Outcome: Progressing     Problem: Pain  Goal: Verbalizes/displays adequate comfort level or baseline comfort level  Outcome: Progressing     Problem: Safety - Adult  Goal: Free from fall injury  Outcome: Progressing     Problem: Cardiovascular - Adult  Goal: Absence of cardiac dysrhythmias or at baseline  Outcome: Progressing     Problem: Hematologic - Adult  Goal: Maintains hematologic stability  Outcome: Progressing

## 2023-08-02 NOTE — PROGRESS NOTES
Discharge teaching complete. Patient verbalized understanding of diet, activity, S/Sx as reviewed, and follow-up appointment. Rx given to patient. Discharge instructions given to patient.  machine used with discharge education.

## 2023-08-02 NOTE — PROGRESS NOTES
Call from monitor room, patient leah down to 47 bpm and sustaining. Writer assessed patient. Patient resting comfortably. Denies SOB or chest pains. Once patient was awakened heart rate increased to 76.

## 2023-08-29 PROBLEM — N39.0 UTI (URINARY TRACT INFECTION): Status: RESOLVED | Noted: 2023-07-30 | Resolved: 2023-08-29

## 2025-06-27 ENCOUNTER — HOSPITAL ENCOUNTER (OUTPATIENT)
Age: 40
Setting detail: OBSERVATION
Discharge: HOME | End: 2025-06-29
Attending: EMERGENCY MEDICINE | Admitting: HOSPITALIST

## 2025-06-27 DIAGNOSIS — D62 ACUTE BLOOD LOSS ANEMIA: ICD-10-CM

## 2025-06-27 DIAGNOSIS — D62 ACUTE ON CHRONIC BLOOD LOSS ANEMIA: Primary | ICD-10-CM

## 2025-06-27 DIAGNOSIS — Z87.42 HISTORY OF HEAVY PERIODS: ICD-10-CM

## 2025-06-27 DIAGNOSIS — N92.1 MENORRHAGIA WITH IRREGULAR CYCLE: ICD-10-CM

## 2025-06-27 LAB
ALBUMIN SERPL-MCNC: 4.1 G/DL (ref 3.5–5)
ALBUMIN/GLOB SERPL: 0.9 (ref 1–1.9)
ALP SERPL-CCNC: 91 U/L (ref 35–104)
ALT SERPL-CCNC: 13 U/L (ref 8–45)
ANION GAP SERPL CALC-SCNC: 14 MMOL/L (ref 7–16)
APPEARANCE UR: CLEAR
AST SERPL-CCNC: 21 U/L (ref 15–37)
BASOPHILS # BLD: 0.05 K/UL (ref 0–0.2)
BASOPHILS NFR BLD: 0.7 % (ref 0–2)
BILIRUB SERPL-MCNC: 0.4 MG/DL (ref 0–1.2)
BILIRUB UR QL: NEGATIVE
BUN SERPL-MCNC: 7 MG/DL (ref 6–23)
CALCIUM SERPL-MCNC: 9.8 MG/DL (ref 8.8–10.2)
CHLORIDE SERPL-SCNC: 103 MMOL/L (ref 98–107)
CO2 SERPL-SCNC: 22 MMOL/L (ref 20–29)
COLOR UR: NORMAL
CREAT SERPL-MCNC: 0.62 MG/DL (ref 0.6–1.1)
DIFFERENTIAL METHOD BLD: ABNORMAL
EOSINOPHIL # BLD: 0.11 K/UL (ref 0–0.8)
EOSINOPHIL NFR BLD: 1.6 % (ref 0.5–7.8)
ERYTHROCYTE [DISTWIDTH] IN BLOOD BY AUTOMATED COUNT: 21.6 % (ref 11.9–14.6)
FLUAV RNA SPEC QL NAA+PROBE: NOT DETECTED
FLUBV RNA SPEC QL NAA+PROBE: NOT DETECTED
GLOBULIN SER CALC-MCNC: 4.3 G/DL (ref 2.3–3.5)
GLUCOSE SERPL-MCNC: 94 MG/DL (ref 70–99)
GLUCOSE UR STRIP.AUTO-MCNC: NEGATIVE MG/DL
HCG UR QL: NEGATIVE
HCT VFR BLD AUTO: 25.5 % (ref 35.8–46.3)
HGB BLD-MCNC: 6.6 G/DL (ref 11.7–15.4)
HGB UR QL STRIP: NEGATIVE
HISTORY CHECK: NORMAL
IMM GRANULOCYTES # BLD AUTO: 0.03 K/UL (ref 0–0.5)
IMM GRANULOCYTES NFR BLD AUTO: 0.4 % (ref 0–5)
KETONES UR QL STRIP.AUTO: NEGATIVE MG/DL
LEUKOCYTE ESTERASE UR QL STRIP.AUTO: NEGATIVE
LIPASE SERPL-CCNC: 43 U/L (ref 13–60)
LYMPHOCYTES # BLD: 2.81 K/UL (ref 0.5–4.6)
LYMPHOCYTES NFR BLD: 40.8 % (ref 13–44)
MCH RBC QN AUTO: 14.1 PG (ref 26.1–32.9)
MCHC RBC AUTO-ENTMCNC: 25.9 G/DL (ref 31.4–35)
MCV RBC AUTO: 54.4 FL (ref 82–102)
MONOCYTES # BLD: 0.5 K/UL (ref 0.1–1.3)
MONOCYTES NFR BLD: 7.3 % (ref 4–12)
NEUTS SEG # BLD: 3.39 K/UL (ref 1.7–8.2)
NEUTS SEG NFR BLD: 49.2 % (ref 43–78)
NITRITE UR QL STRIP.AUTO: NEGATIVE
NRBC # BLD: 0 K/UL (ref 0–0.2)
PH UR STRIP: 5.5 (ref 5–9)
PLATELET # BLD AUTO: 332 K/UL (ref 150–450)
PMV BLD AUTO: ABNORMAL FL (ref 9.4–12.3)
POTASSIUM SERPL-SCNC: 3.7 MMOL/L (ref 3.5–5.1)
PROT SERPL-MCNC: 8.4 G/DL (ref 6.3–8.2)
PROT UR STRIP-MCNC: NEGATIVE MG/DL
RBC # BLD AUTO: 4.69 M/UL (ref 4.05–5.2)
SARS-COV-2 RDRP RESP QL NAA+PROBE: NOT DETECTED
SODIUM SERPL-SCNC: 139 MMOL/L (ref 136–145)
SOURCE: NORMAL
SP GR UR REFRACTOMETRY: 1.01 (ref 1–1.02)
UROBILINOGEN UR QL STRIP.AUTO: 0.2 EU/DL (ref 0.2–1)
WBC # BLD AUTO: 6.9 K/UL (ref 4.3–11.1)

## 2025-06-27 PROCEDURE — 86901 BLOOD TYPING SEROLOGIC RH(D): CPT

## 2025-06-27 PROCEDURE — 81025 URINE PREGNANCY TEST: CPT

## 2025-06-27 PROCEDURE — 86900 BLOOD TYPING SEROLOGIC ABO: CPT

## 2025-06-27 PROCEDURE — G0378 HOSPITAL OBSERVATION PER HR: HCPCS

## 2025-06-27 PROCEDURE — 85025 COMPLETE CBC W/AUTO DIFF WBC: CPT

## 2025-06-27 PROCEDURE — 81003 URINALYSIS AUTO W/O SCOPE: CPT

## 2025-06-27 PROCEDURE — 86850 RBC ANTIBODY SCREEN: CPT

## 2025-06-27 PROCEDURE — 96374 THER/PROPH/DIAG INJ IV PUSH: CPT

## 2025-06-27 PROCEDURE — P9016 RBC LEUKOCYTES REDUCED: HCPCS

## 2025-06-27 PROCEDURE — 99285 EMERGENCY DEPT VISIT HI MDM: CPT

## 2025-06-27 PROCEDURE — 87636 SARSCOV2 & INF A&B AMP PRB: CPT

## 2025-06-27 PROCEDURE — 2580000003 HC RX 258: Performed by: EMERGENCY MEDICINE

## 2025-06-27 PROCEDURE — 6360000002 HC RX W HCPCS: Performed by: EMERGENCY MEDICINE

## 2025-06-27 PROCEDURE — 80053 COMPREHEN METABOLIC PANEL: CPT

## 2025-06-27 PROCEDURE — 83690 ASSAY OF LIPASE: CPT

## 2025-06-27 PROCEDURE — 86923 COMPATIBILITY TEST ELECTRIC: CPT

## 2025-06-27 PROCEDURE — 36430 TRANSFUSION BLD/BLD COMPNT: CPT

## 2025-06-27 RX ORDER — POLYETHYLENE GLYCOL 3350 17 G/17G
17 POWDER, FOR SOLUTION ORAL DAILY PRN
Status: DISCONTINUED | OUTPATIENT
Start: 2025-06-27 | End: 2025-06-29 | Stop reason: HOSPADM

## 2025-06-27 RX ORDER — POTASSIUM CHLORIDE 1500 MG/1
40 TABLET, EXTENDED RELEASE ORAL PRN
Status: DISCONTINUED | OUTPATIENT
Start: 2025-06-27 | End: 2025-06-29 | Stop reason: HOSPADM

## 2025-06-27 RX ORDER — SODIUM CHLORIDE 0.9 % (FLUSH) 0.9 %
5-40 SYRINGE (ML) INJECTION PRN
Status: DISCONTINUED | OUTPATIENT
Start: 2025-06-27 | End: 2025-06-29 | Stop reason: HOSPADM

## 2025-06-27 RX ORDER — SODIUM CHLORIDE 9 MG/ML
INJECTION, SOLUTION INTRAVENOUS PRN
Status: DISCONTINUED | OUTPATIENT
Start: 2025-06-27 | End: 2025-06-29 | Stop reason: HOSPADM

## 2025-06-27 RX ORDER — ONDANSETRON 4 MG/1
4 TABLET, ORALLY DISINTEGRATING ORAL EVERY 8 HOURS PRN
Status: DISCONTINUED | OUTPATIENT
Start: 2025-06-27 | End: 2025-06-29 | Stop reason: HOSPADM

## 2025-06-27 RX ORDER — 0.9 % SODIUM CHLORIDE 0.9 %
1000 INTRAVENOUS SOLUTION INTRAVENOUS ONCE
Status: COMPLETED | OUTPATIENT
Start: 2025-06-27 | End: 2025-06-27

## 2025-06-27 RX ORDER — ONDANSETRON 2 MG/ML
4 INJECTION INTRAMUSCULAR; INTRAVENOUS EVERY 6 HOURS PRN
Status: DISCONTINUED | OUTPATIENT
Start: 2025-06-27 | End: 2025-06-29 | Stop reason: HOSPADM

## 2025-06-27 RX ORDER — SODIUM CHLORIDE 9 MG/ML
INJECTION, SOLUTION INTRAVENOUS CONTINUOUS
Status: DISCONTINUED | OUTPATIENT
Start: 2025-06-27 | End: 2025-06-27

## 2025-06-27 RX ORDER — DROPERIDOL 2.5 MG/ML
1.25 INJECTION, SOLUTION INTRAMUSCULAR; INTRAVENOUS ONCE
Status: COMPLETED | OUTPATIENT
Start: 2025-06-27 | End: 2025-06-27

## 2025-06-27 RX ORDER — MAGNESIUM SULFATE IN WATER 40 MG/ML
2000 INJECTION, SOLUTION INTRAVENOUS PRN
Status: DISCONTINUED | OUTPATIENT
Start: 2025-06-27 | End: 2025-06-29 | Stop reason: HOSPADM

## 2025-06-27 RX ORDER — POTASSIUM CHLORIDE 7.45 MG/ML
10 INJECTION INTRAVENOUS PRN
Status: DISCONTINUED | OUTPATIENT
Start: 2025-06-27 | End: 2025-06-29 | Stop reason: HOSPADM

## 2025-06-27 RX ORDER — ACETAMINOPHEN 325 MG/1
650 TABLET ORAL EVERY 6 HOURS PRN
Status: DISCONTINUED | OUTPATIENT
Start: 2025-06-27 | End: 2025-06-29 | Stop reason: HOSPADM

## 2025-06-27 RX ORDER — SODIUM CHLORIDE 0.9 % (FLUSH) 0.9 %
5-40 SYRINGE (ML) INJECTION EVERY 12 HOURS SCHEDULED
Status: DISCONTINUED | OUTPATIENT
Start: 2025-06-27 | End: 2025-06-29 | Stop reason: HOSPADM

## 2025-06-27 RX ADMIN — SODIUM CHLORIDE 1000 ML: 0.9 INJECTION, SOLUTION INTRAVENOUS at 20:41

## 2025-06-27 RX ADMIN — DROPERIDOL 1.25 MG: 2.5 INJECTION, SOLUTION INTRAMUSCULAR; INTRAVENOUS at 20:58

## 2025-06-27 ASSESSMENT — PAIN - FUNCTIONAL ASSESSMENT: PAIN_FUNCTIONAL_ASSESSMENT: 0-10

## 2025-06-27 ASSESSMENT — PAIN SCALES - GENERAL
PAINLEVEL_OUTOF10: 0
PAINLEVEL_OUTOF10: 8

## 2025-06-27 NOTE — ED TRIAGE NOTES
[id number]  Patient arrived with a complaint of headache, bilateral ear ringing and tingling to bilateral legs. Patient reports of urinary frequency, cloudy, odor, abdomen pain, back pain, constant nausea, body ache,   Symptoms since 2 weeks.        
97

## 2025-06-27 NOTE — ED PROVIDER NOTES
Globulin 4.3 (H) 2.3 - 3.5 g/dL    Albumin/Globulin Ratio 0.9 (L) 1.0 - 1.9     CBC with Auto Differential   Result Value Ref Range    WBC 6.9 4.3 - 11.1 K/uL    RBC 4.69 4.05 - 5.2 M/uL    Hemoglobin 6.6 (LL) 11.7 - 15.4 g/dL    Hematocrit 25.5 (L) 35.8 - 46.3 %    MCV 54.4 (L) 82 - 102 FL    MCH 14.1 (L) 26.1 - 32.9 PG    MCHC 25.9 (L) 31.4 - 35.0 g/dL    RDW 21.6 (H) 11.9 - 14.6 %    Platelets 332 150 - 450 K/uL    MPV Unable to calculate. Recommend adding IPF. 9.4 - 12.3 FL    nRBC 0.00 0.0 - 0.2 K/uL    Differential Type AUTOMATED      Neutrophils % 49.2 43.0 - 78.0 %    Lymphocytes % 40.8 13.0 - 44.0 %    Monocytes % 7.3 4.0 - 12.0 %    Eosinophils % 1.6 0.5 - 7.8 %    Basophils % 0.7 0.0 - 2.0 %    Immature Granulocytes % 0.4 0.0 - 5.0 %    Neutrophils Absolute 3.39 1.70 - 8.20 K/UL    Lymphocytes Absolute 2.81 0.50 - 4.60 K/UL    Monocytes Absolute 0.50 0.10 - 1.30 K/UL    Eosinophils Absolute 0.11 0.00 - 0.80 K/UL    Basophils Absolute 0.05 0.00 - 0.20 K/UL    Immature Granulocytes Absolute 0.03 0.0 - 0.5 K/UL   Lipase   Result Value Ref Range    Lipase 43 13 - 60 U/L   Urinalysis w rflx microscopic   Result Value Ref Range    Color, UA YELLOW/STRAW      Appearance CLEAR      Specific Gravity, UA 1.014 1.001 - 1.023      pH, Urine 5.5 5.0 - 9.0      Protein, UA Negative NEG mg/dL    Glucose, Ur Negative NEG mg/dL    Ketones, Urine Negative NEG mg/dL    Bilirubin, Urine Negative NEG      Blood, Urine Negative NEG      Urobilinogen, Urine 0.2 0.2 - 1.0 EU/dL    Nitrite, Urine Negative NEG      Leukocyte Esterase, Urine Negative NEG     POC Pregnancy Urine Qual   Result Value Ref Range    Preg Test, Ur Negative NEG     PREPARE RBC (CROSSMATCH), 1 Units   Result Value Ref Range    History Check Historical check performed    TYPE AND SCREEN   Result Value Ref Range    Crossmatch expiration date 06/30/2025,2358     ABO/Rh O POSITIVE     Antibody Screen NEG          No orders to display                Recent

## 2025-06-28 ENCOUNTER — APPOINTMENT (OUTPATIENT)
Dept: ULTRASOUND IMAGING | Age: 40
End: 2025-06-28

## 2025-06-28 PROBLEM — D50.9 IDA (IRON DEFICIENCY ANEMIA): Status: ACTIVE | Noted: 2025-06-28

## 2025-06-28 PROBLEM — N92.0 MENORRHAGIA: Status: ACTIVE | Noted: 2025-06-28

## 2025-06-28 LAB
ABO + RH BLD: NORMAL
BASOPHILS # BLD: 0.05 K/UL (ref 0–0.2)
BASOPHILS NFR BLD: 1 % (ref 0–2)
BLD PROD TYP BPU: NORMAL
BLOOD BANK BLOOD PRODUCT EXPIRATION DATE: NORMAL
BLOOD BANK CMNT PATIENT-IMP: NORMAL
BLOOD BANK DISPENSE STATUS: NORMAL
BLOOD BANK ISBT PRODUCT BLOOD TYPE: 5100
BLOOD BANK PRODUCT CODE: NORMAL
BLOOD BANK UNIT TYPE AND RH: NORMAL
BLOOD GROUP ANTIBODIES SERPL: NORMAL
BPU ID: NORMAL
CROSSMATCH RESULT: NORMAL
DIFFERENTIAL METHOD BLD: ABNORMAL
EOSINOPHIL # BLD: 0.13 K/UL (ref 0–0.8)
EOSINOPHIL NFR BLD: 2.7 % (ref 0.5–7.8)
ERYTHROCYTE [DISTWIDTH] IN BLOOD BY AUTOMATED COUNT: 25.7 % (ref 11.9–14.6)
EST. AVERAGE GLUCOSE BLD GHB EST-MCNC: 103 MG/DL
FERRITIN SERPL-MCNC: 3 NG/ML (ref 8–388)
FOLATE SERPL-MCNC: 13.9 NG/ML (ref 3.1–17.5)
HBA1C MFR BLD: 5.2 % (ref 0–5.6)
HCT VFR BLD AUTO: 27.6 % (ref 35.8–46.3)
HGB BLD-MCNC: 7.5 G/DL (ref 11.7–15.4)
HGB RETIC QN AUTO: 14 PG (ref 29–35)
IMM GRANULOCYTES # BLD AUTO: 0.01 K/UL (ref 0–0.5)
IMM GRANULOCYTES NFR BLD AUTO: 0.2 % (ref 0–5)
IMM RETICS NFR: 20.5 % (ref 3–15.9)
IRON SATN MFR SERPL: 3 % (ref 20–50)
IRON SERPL-MCNC: 12 UG/DL (ref 35–100)
LDH SERPL L TO P-CCNC: 188 U/L (ref 127–281)
LYMPHOCYTES # BLD: 2.07 K/UL (ref 0.5–4.6)
LYMPHOCYTES NFR BLD: 42.3 % (ref 13–44)
MCH RBC QN AUTO: 15.9 PG (ref 26.1–32.9)
MCHC RBC AUTO-ENTMCNC: 27.2 G/DL (ref 31.4–35)
MCV RBC AUTO: 58.6 FL (ref 82–102)
MONOCYTES # BLD: 0.48 K/UL (ref 0.1–1.3)
MONOCYTES NFR BLD: 9.7 % (ref 4–12)
NEUTS SEG # BLD: 2.16 K/UL (ref 1.7–8.2)
NEUTS SEG NFR BLD: 44.1 % (ref 43–78)
NRBC # BLD: 0 K/UL (ref 0–0.2)
PLATELET # BLD AUTO: 290 K/UL (ref 150–450)
PLATELET COMMENT: ADEQUATE
PMV BLD AUTO: ABNORMAL FL (ref 9.4–12.3)
PROLACTIN SERPL-MCNC: 29.6 NG/ML (ref 4.8–23.3)
RBC # BLD AUTO: 4.71 M/UL (ref 4.05–5.2)
RBC MORPH BLD: ABNORMAL
RETICS # AUTO: 0.03 M/UL (ref 0.03–0.1)
RETICS/RBC NFR AUTO: 0.6 % (ref 0.3–2)
SPECIMEN EXP DATE BLD: NORMAL
TIBC SERPL-MCNC: 411 UG/DL (ref 240–450)
TSH W FREE THYROID IF ABNORMAL: 1.2 UIU/ML (ref 0.27–4.2)
UIBC SERPL-MCNC: 399 UG/DL (ref 112–347)
UNIT DIVISION: 0
UNIT ISSUE DATE/TIME: NORMAL
VIT B12 SERPL-MCNC: 319 PG/ML (ref 193–986)
WBC # BLD AUTO: 4.9 K/UL (ref 4.3–11.1)
WBC MORPH BLD: ABNORMAL

## 2025-06-28 PROCEDURE — 76830 TRANSVAGINAL US NON-OB: CPT

## 2025-06-28 PROCEDURE — 83550 IRON BINDING TEST: CPT

## 2025-06-28 PROCEDURE — 83615 LACTATE (LD) (LDH) ENZYME: CPT

## 2025-06-28 PROCEDURE — 85046 RETICYTE/HGB CONCENTRATE: CPT

## 2025-06-28 PROCEDURE — 84443 ASSAY THYROID STIM HORMONE: CPT

## 2025-06-28 PROCEDURE — G0378 HOSPITAL OBSERVATION PER HR: HCPCS

## 2025-06-28 PROCEDURE — 82728 ASSAY OF FERRITIN: CPT

## 2025-06-28 PROCEDURE — 2500000003 HC RX 250 WO HCPCS: Performed by: HOSPITALIST

## 2025-06-28 PROCEDURE — 82746 ASSAY OF FOLIC ACID SERUM: CPT

## 2025-06-28 PROCEDURE — 83036 HEMOGLOBIN GLYCOSYLATED A1C: CPT

## 2025-06-28 PROCEDURE — 83540 ASSAY OF IRON: CPT

## 2025-06-28 PROCEDURE — 6370000000 HC RX 637 (ALT 250 FOR IP): Performed by: OBSTETRICS & GYNECOLOGY

## 2025-06-28 PROCEDURE — 84146 ASSAY OF PROLACTIN: CPT

## 2025-06-28 PROCEDURE — 36415 COLL VENOUS BLD VENIPUNCTURE: CPT

## 2025-06-28 PROCEDURE — 83010 ASSAY OF HAPTOGLOBIN QUANT: CPT

## 2025-06-28 PROCEDURE — 82607 VITAMIN B-12: CPT

## 2025-06-28 PROCEDURE — 85025 COMPLETE CBC W/AUTO DIFF WBC: CPT

## 2025-06-28 RX ORDER — FERROUS SULFATE 325(65) MG
325 TABLET ORAL
Status: DISCONTINUED | OUTPATIENT
Start: 2025-06-28 | End: 2025-06-28

## 2025-06-28 RX ORDER — MEGESTROL ACETATE 40 MG/1
40 TABLET ORAL 2 TIMES DAILY
Status: DISCONTINUED | OUTPATIENT
Start: 2025-06-28 | End: 2025-06-29 | Stop reason: HOSPADM

## 2025-06-28 RX ADMIN — SODIUM CHLORIDE, PRESERVATIVE FREE 10 ML: 5 INJECTION INTRAVENOUS at 13:38

## 2025-06-28 RX ADMIN — MEGESTROL ACETATE 40 MG: 40 TABLET ORAL at 19:39

## 2025-06-28 RX ADMIN — SODIUM CHLORIDE, PRESERVATIVE FREE 10 ML: 5 INJECTION INTRAVENOUS at 19:43

## 2025-06-28 ASSESSMENT — PAIN SCALES - GENERAL
PAINLEVEL_OUTOF10: 0

## 2025-06-28 NOTE — H&P
Hospitalist History and Physical   Admit Date:  2025  7:38 PM   Name:  Porsha Ferrell   Age:  39 y.o.  Sex:  female  :  1985   MRN:  [id number]   Room:  ER/    Presenting/Chief Complaint: Influenza and Headache     Reason(s) for Admission: Acute blood loss anemia [D62]     History of Present Illness:       39 years old female with past medical history of no major medical problems presented to emergency room with chief complaint of nausea, abdominal pain, generalized weakness, but denies any dysuria, vomiting, fever, chills.  Further evaluated in ER, labs were obtained shows hemoglobin of 6.6.  1 unit of blood transfusion was ordered.  Patient has a history of heavy menstruation but not actively having menstrual cycle, patient reports for past 6 months having heavy menstruation not before.  .1 unit of blood transfusion was ordered, ER physician requested admission of this patient for further evaluation and management.          Assessment & Plan:     Principal Problem:    Acute blood loss anemia, menorrhagia: Likely secondary to menorrhagia, 1 unit of blood transfusion ordered, will request consultation with GYN, transvaginal non-OB ultrasound ordered.    Generalized weakness: Likely patient's symptoms are related to anemia.  Anemia workup ordered.          Diet: ADULT DIET; Regular  VTE prophylaxis: SCD's   Code status: Full Code        Non-peripheral Lines and Tubes (if present):             Hospital Problems:  Principal Problem:    Acute blood loss anemia  Resolved Problems:    * No resolved hospital problems. *        Objective:   Patient Vitals for the past 24 hrs:   Temp Pulse Resp BP SpO2   25 1806 99.1 °F (37.3 °C) (!) 103 17 117/76 99 %       Oxygen Therapy  SpO2: 99 %    Estimated body mass index is 21.09 kg/m² as calculated from the following:    Height as of this encounter: 1.6 m (5' 2.99\").    Weight as of this encounter: 54 kg (119 lb 0.8 oz).  No intake or output data 
2.09

## 2025-06-28 NOTE — PROGRESS NOTES
4 Eyes Skin Assessment     NAME:  Porsha Ferrell  YOB: 1985  MEDICAL RECORD NUMBER:  [id number]    The patient is being assessed for  Admission    I agree that at least one RN has performed a thorough Head to Toe Skin Assessment on the patient. ALL assessment sites listed below have been assessed.      Areas assessed by both nurses:    Head, Face, Ears, Shoulders, Back, Chest, Arms, Elbows, Hands, Sacrum. Buttock, Coccyx, Ischium, and Legs. Feet and Heels        Does the Patient have a Wound? No noted wound(s)       Janes Prevention initiated by RN: Yes  Wound Care Orders initiated by RN: No    Pressure Injury (Stage 1,2,3,4, Unstageable, DTI, NWPT, and Complex wounds) if present, place Wound referral order by RN under : No    New Ostomies, if present place, Ostomy referral order under : No     Nurse 1 eSignature: Electronically signed by GINGER SANTOS RN on 6/28/25 at 12:12 AM EDT    **SHARE this note so that the co-signing nurse can place an eSignature**    Nurse 2 eSignature: {Esignature:[id number]}

## 2025-06-28 NOTE — ED NOTES
TRANSFER - OUT REPORT:    Verbal report given to ALVIN Tidwell on Porsha Ferrell  being transferred to 2227 for routine progression of patient care       Report consisted of patient's Situation, Background, Assessment and   Recommendations(SBAR).     Information from the following report(s) ED SBAR was reviewed with the receiving nurse.    Rembrandt Fall Assessment:    Presents to emergency department  because of falls (Syncope, seizure, or loss of consciousness): No  Age > 70: No  Altered Mental Status, Intoxication with alcohol or substance confusion (Disorientation, impaired judgment, poor safety awaremess, or inability to follow instructions): No  Impaired Mobility: Ambulates or transfers with assistive devices or assistance; Unable to ambulate or transer.: No             Lines:   Peripheral IV 06/27/25 Proximal;Right Forearm (Active)        Opportunity for questions and clarification was provided.      Patient transported with:  Registered Nurse          High, Mario, RN  06/27/25 2578

## 2025-06-28 NOTE — CONSENT
Informed Consent for Blood Component Transfusion Note    I have discussed with the patient the rationale for blood component transfusion; its benefits in treating or preventing fatigue, organ damage, or death; and its risk which includes mild transfusion reactions, rare risk of blood borne infection, or more serious but rare reactions. I have discussed the alternatives to transfusion, including the risk and consequences of not receiving transfusion. The patient had an opportunity to ask questions and had agreed to proceed with transfusion of blood components.    Electronically signed by Kelli Silva MD on 6/27/25 at 10:01 PM EDT

## 2025-06-28 NOTE — PROGRESS NOTES
4 Eyes Skin Assessment     NAME:  Porsha Ferrell  YOB: 1985  MEDICAL RECORD NUMBER:  [id number]    The patient is being assessed for  Transfer to New Unit    I agree that at least one RN has performed a thorough Head to Toe Skin Assessment on the patient. ALL assessment sites listed below have been assessed.      Areas assessed by both nurses:    Head, Face, Ears, Shoulders, Back, Chest, Arms, Elbows, Hands, Sacrum. Buttock, Coccyx, Ischium, and Legs. Feet and Heels        Does the Patient have a Wound? No noted wound(s)       Janes Prevention initiated by RN: Yes  Wound Care Orders initiated by RN: No    Pressure Injury (Stage 3,4, Unstageable, DTI, NWPT, and Complex wounds) if present, place Wound referral order by RN under : No    New Ostomies, if present place, Ostomy referral order under : No     Nurse 1 eSignature: Electronically signed by Arun Hutchins RN on 6/28/25 at 3:57 PM EDT    **SHARE this note so that the co-signing nurse can place an eSignature**    Nurse 2 eSignature: Electronically signed by Archana Ta RN on 6/28/25 at 5:34 PM EDT

## 2025-06-28 NOTE — PLAN OF CARE
Problem: Discharge Planning  Goal: Discharge to home or other facility with appropriate resources  Outcome: Progressing     Problem: Pain  Goal: Verbalizes/displays adequate comfort level or baseline comfort level  Outcome: Progressing     Problem: Safety - Adult  Goal: Free from fall injury  6/28/2025 0010 by Diann Carvalho, RN  Outcome: Progressing  6/28/2025 0009 by Diann Carvalho, RN  Note: Able to understand through language barrier not to get up without calling the nurses

## 2025-06-28 NOTE — PLAN OF CARE
Problem: Discharge Planning  Goal: Discharge to home or other facility with appropriate resources  Outcome: Progressing     Problem: Pain  Goal: Verbalizes/displays adequate comfort level or baseline comfort level  Outcome: Progressing     Problem: Safety - Adult  Goal: Free from fall injury  Outcome: Progressing     Problem: Respiratory - Adult  Goal: Achieves optimal ventilation and oxygenation  Outcome: Progressing     Problem: Cardiovascular - Adult  Goal: Maintains optimal cardiac output and hemodynamic stability  Outcome: Progressing  Goal: Absence of cardiac dysrhythmias or at baseline  Outcome: Progressing     Problem: Skin/Tissue Integrity - Adult  Goal: Skin integrity remains intact  Outcome: Progressing  Goal: Incisions, wounds, or drain sites healing without S/S of infection  Outcome: Progressing  Goal: Oral mucous membranes remain intact  Outcome: Progressing     Problem: Hematologic - Adult  Goal: Maintains hematologic stability  Outcome: Progressing

## 2025-06-28 NOTE — PROGRESS NOTES
TRANSFER - OUT REPORT:    Verbal report given to Arun ESQUIVEL  on Porsha Ferrell  being transferred to 5th floor  for routine progression of patient care       Report consisted of patient's Situation, Background, Assessment and   Recommendations(SBAR).     Information from the following report(s) Nurse Handoff Report was reviewed with the receiving nurse.           Lines:   Peripheral IV 06/27/25 Proximal;Right Forearm (Active)   Site Assessment Clean, dry & intact 06/28/25 0005   Line Status Infusing 06/28/25 0005   Line Care Connections checked and tightened 06/28/25 0005   Phlebitis Assessment No symptoms 06/28/25 0005   Infiltration Assessment 0 06/28/25 0005   Alcohol Cap Used Yes 06/28/25 0005   Dressing Status Clean, dry & intact 06/28/25 0005   Dressing Type Transparent 06/28/25 0005        Opportunity for questions and clarification was provided.      Patient transported with:  Tech

## 2025-06-28 NOTE — PROGRESS NOTES
Hospitalist Progress Note   Admit Date:  2025  7:38 PM   Name:  Porsha Ferrell   Age:  39 y.o.  Sex:  female  :  1985   MRN:  [id number]   Room:      Presenting/Chief Complaint: Influenza and Headache     Reason(s) for Admission: Acute blood loss anemia [D62]  History of heavy periods [Z87.42]  Acute on chronic blood loss anemia [D62]     Hospital Course:   Porsha Ferrell is a 39 y.o. female with no significant past medical history who presented with headache associated with tingling sensation of bilateral lower extremities.  Patient also reported myalgias and nausea associated with generalized weakness of 2-week duration.  Patient reported a history of menorrhagia but currently not on her menstrual cycle.    In ED, VSS.  Hemoglobin 6.7 (baseline  and ).  UA was not consistent with UTI.  COVID-19 and influenza was negative.  Iron studies consistent with severe iron deficiency anemia.  Pelvic ultrasound shows ovarian cysts. Pt received 1u PRBC in ED.    Patient was admitted with acute blood loss anemia secondary to menorrhagia.  No evidence of active bleeding elsewhere.  Patient was given 1 unit PRBC in the ED with hemoglobin      Subjective & 24hr Events:     Encounter was carried out with assistance of .  Patient at bedside stated that she feels a lot better.  Hemoglobin 7.5 this morning from 1 unit PRBC transfusion yesterday.  Currently not on her menstrual cycles but confirmed that she does have heavy menstrual cycles.  She reported tolerating p.o. iron supplements in the past while she was pregnant.  Denies pain.      Assessment & Plan:     Acute blood loss anemia / symptomatic anemia  Menorrhagia  YEVGENIY  Hemoglobin was 6.7 in the ED (baseline was ~7 in , was 9.2 in ). Pt received 1u PRBC in ED.Iron studies consistent with severe YEVGENIY  Pelvic ultrasound shows ovarian cysts.   Add p.o. ferrous sulfate as patient tolerated  Consult OB/GYN for

## 2025-06-29 VITALS
HEART RATE: 84 BPM | BODY MASS INDEX: 24.69 KG/M2 | DIASTOLIC BLOOD PRESSURE: 86 MMHG | HEIGHT: 63 IN | WEIGHT: 139.33 LBS | SYSTOLIC BLOOD PRESSURE: 129 MMHG | RESPIRATION RATE: 20 BRPM | TEMPERATURE: 98.4 F | OXYGEN SATURATION: 99 %

## 2025-06-29 DIAGNOSIS — D50.0 IRON DEFICIENCY ANEMIA DUE TO CHRONIC BLOOD LOSS: ICD-10-CM

## 2025-06-29 DIAGNOSIS — D62 ACUTE BLOOD LOSS ANEMIA: Primary | ICD-10-CM

## 2025-06-29 PROBLEM — Z87.42 HISTORY OF HEAVY PERIODS: Status: ACTIVE | Noted: 2025-06-29

## 2025-06-29 LAB
ANION GAP SERPL CALC-SCNC: 12 MMOL/L (ref 7–16)
BUN SERPL-MCNC: 9 MG/DL (ref 6–23)
CALCIUM SERPL-MCNC: 9.4 MG/DL (ref 8.8–10.2)
CHLORIDE SERPL-SCNC: 104 MMOL/L (ref 98–107)
CO2 SERPL-SCNC: 23 MMOL/L (ref 20–29)
CREAT SERPL-MCNC: 0.56 MG/DL (ref 0.6–1.1)
ERYTHROCYTE [DISTWIDTH] IN BLOOD BY AUTOMATED COUNT: 25.7 % (ref 11.9–14.6)
GLUCOSE SERPL-MCNC: 104 MG/DL (ref 70–99)
HCT VFR BLD AUTO: 28.7 % (ref 35.8–46.3)
HGB BLD-MCNC: 7.8 G/DL (ref 11.7–15.4)
MCH RBC QN AUTO: 15.6 PG (ref 26.1–32.9)
MCHC RBC AUTO-ENTMCNC: 27.2 G/DL (ref 31.4–35)
MCV RBC AUTO: 57.4 FL (ref 82–102)
NRBC # BLD: 0 K/UL (ref 0–0.2)
PLATELET # BLD AUTO: 309 K/UL (ref 150–450)
PMV BLD AUTO: ABNORMAL FL (ref 9.4–12.3)
POTASSIUM SERPL-SCNC: 3.8 MMOL/L (ref 3.5–5.1)
RBC # BLD AUTO: 5 M/UL (ref 4.05–5.2)
SODIUM SERPL-SCNC: 140 MMOL/L (ref 136–145)
WBC # BLD AUTO: 6.7 K/UL (ref 4.3–11.1)

## 2025-06-29 PROCEDURE — 80048 BASIC METABOLIC PNL TOTAL CA: CPT

## 2025-06-29 PROCEDURE — 2500000003 HC RX 250 WO HCPCS: Performed by: HOSPITALIST

## 2025-06-29 PROCEDURE — G0378 HOSPITAL OBSERVATION PER HR: HCPCS

## 2025-06-29 PROCEDURE — 99222 1ST HOSP IP/OBS MODERATE 55: CPT | Performed by: STUDENT IN AN ORGANIZED HEALTH CARE EDUCATION/TRAINING PROGRAM

## 2025-06-29 PROCEDURE — 36415 COLL VENOUS BLD VENIPUNCTURE: CPT

## 2025-06-29 PROCEDURE — APPSS60 APP SPLIT SHARED TIME 46-60 MINUTES: Performed by: NURSE PRACTITIONER

## 2025-06-29 PROCEDURE — 85027 COMPLETE CBC AUTOMATED: CPT

## 2025-06-29 PROCEDURE — 6370000000 HC RX 637 (ALT 250 FOR IP): Performed by: OBSTETRICS & GYNECOLOGY

## 2025-06-29 RX ORDER — MEGESTROL ACETATE 40 MG/1
40 TABLET ORAL 2 TIMES DAILY
Qty: 60 TABLET | Refills: 0 | Status: SHIPPED | OUTPATIENT
Start: 2025-06-29 | End: 2025-06-29

## 2025-06-29 RX ORDER — MEGESTROL ACETATE 40 MG/1
40 TABLET ORAL 2 TIMES DAILY
Qty: 60 TABLET | Refills: 1 | Status: SHIPPED | OUTPATIENT
Start: 2025-06-29

## 2025-06-29 RX ADMIN — SODIUM CHLORIDE, PRESERVATIVE FREE 10 ML: 5 INJECTION INTRAVENOUS at 08:38

## 2025-06-29 RX ADMIN — MEGESTROL ACETATE 40 MG: 40 TABLET ORAL at 08:38

## 2025-06-29 NOTE — CONSULTS
Gyn Consultation        CHIEF COMPLAINT:   heavy menstrual bleeding    Reason for Admission:  symptomatic anemia    History obtained from patient    HISTORY OF PRESENT ILLNESS:                     The patient is a 39 y.o. female who gynecology is consulted on due to heavy menstrual bleeding. She is not currently bleeding, and is not having any gynecologic concerns at present. This is a chronic problem which has been present for the last two years. She was admitted back in  for symptomatic anemia, but did not follow-up with a gynecologist upon discharge. She has not seen a gynecologist in several years. She reports that menses are regular, occurring every 28 days, heavy in flow necessitating changing a pad every 1-2 hours at beginning of cycle. She does experience cramping, and this occurs at the beginning of cycle as well, but does not require use of ibuprofen or tylenol to assist. She endorses passage of finger size clots and some \"tissue\" with menses.     She denies any intermenstrual bleeding. She is sexually active with one partner who she has been monogamous with for the last six months. She denies history of STD's. She does not consistently use condoms when she is sexually active. She does endorse occasional post-coital bleeding/spotting.     She denies any other medical history. She has had two pregnancies in the past, both delivered by  section. She has had a tubal ligation for contraceptive reasons. Last Pap two years ago in [facility].     At present, she reports feeling well. She denies any concerns. She feels as though her energy is better after blood transfusion, and denies any symptoms of lightheadedness/dizziness.      Past Medical History:    History reviewed. No pertinent past medical history.  Past Surgical History:    No past surgical history on file.    Past Gynecological History:    1. Last menstrual period: 27  2. Menses: interval:  4 weeks, heavy flow, lasting 5-6 days  3. 
Session ID: [id number]  Session Duration: 14 minutes  Language: Citizen of [place]   ID: #[id number]   Name: Cesilia
Session ID: [id number]  Session Duration: 20 minutes  Language: Belgian   ID: #[id number]   Name: Supa
Session ID: [id number]  Session Duration: 5 minutes  Language: Danish   ID: #[id number]   Name: Pete
Session ID: [id number]  Session Duration: Longer than 47 minutes  Language: Sami   ID: #[id number]   Name: Linda
Session ID: [id number]  Session Duration: 10 minutes  Language: Icelandic   ID: #[id number]   Name: Neal
Session ID: [id number]  Session Duration: 8 minutes  Language: Macedonian   ID: #[id number]   Name: Sherita
Session ID: [id number]  Session Duration: 3 minutes  Language: Irish   ID: #[id number]   Name: Jermaine
Session ID: [id number]  Session Duration: Longer than 54 minutes  Language: Wolof   ID: #[id number]   Name: Gita
Please call with questions.          Sheryl Bravo, APRN - CNP   [facility]  [address] [place] [address]  Office : [phone]  Fax : [phone]

## 2025-06-29 NOTE — DISCHARGE SUMMARY
Hospitalist Discharge Summary   Admit Date:  2025  7:38 PM   DC Note date: 2025  Name:  Porsha Ferrell   Age:  39 y.o.  Sex:  female  :  1985   MRN:  [id number]   Room:  [facility] [facility]  PCP:  No primary care provider on file.    Presenting Complaint: Influenza and Headache     Initial Admission Diagnosis: Acute blood loss anemia [D62]  History of heavy periods [Z87.42]  Acute on chronic blood loss anemia [D62]     Problem List for this Hospitalization (present on admission):    Principal Problem:    Acute blood loss anemia  Active Problems:    Symptomatic anemia    YEVGENIY (iron deficiency anemia)    Menorrhagia  Resolved Problems:    * No resolved hospital problems. *      Hospital Course:  Porsha Ferrell is a pleasant Irish-speaking 39 y.o. female with no significant past medical history who presented to the ER on 25 with headache associated with tingling sensation of bilateral lower extremities.  Patient also reported myalgias and nausea associated with generalized weakness of 2-week duration.  Patient reported a history of menorrhagia but currently not on her menstrual cycle. In ED, VSS.  Hemoglobin 6.7 (baseline  and ). Patient was admitted with acute blood loss anemia secondary to menorrhagia. UA was not consistent with UTI.  COVID-19 and influenza was negative.  Iron studies consistent with severe iron deficiency anemia.  Pelvic ultrasound shows ovarian cysts. Pt received 1u PRBC in ED and HGB stabilized at 7.8.   No evidence of active bleeding elsewhere.  Gynecology was consulted who discussed treatments of menorrhagia including progestin therapy, birth control, vs. Surgical approaches. She was started on Megace 40mg BID which will be continued at discharge. She has been counseled on iron deficiency and food sources of iron. She has an allergy listed to ferrous gluconate. She was deemed medically for discharge on 25. She is to follow up outpatient with [facility] for

## 2025-06-29 NOTE — DISCHARGE INSTRUCTIONS
After a Hospital Stay: Managing Appointments (02:22)  Your health professional recommends that you watch this short online health video.  Get tips for how to keep track of and prepare for your follow-up doctor appointments.   Purpose: Use tips for how to keep track of and prepare for follow-up appointments after a hospital stay.  Goal: Use tips for how to keep track of and prepare for follow-up appointments after a hospital stay.    Watch: Scan the QR code or visit the link to view video       [email or web address]  Current as of: October 24, 2024  Content Version: 14.5  © 2006-2025 [employer].   Care instructions adapted under license by [employer]. If you have questions about a medical condition or this instruction, always ask your healthcare professional. [employer] disclaims any warranty or liability for your use of this information.

## 2025-06-29 NOTE — CARE COORDINATION
Pt is for discharge home today with family and no needs/supportive care orders recieved for MSW at this time.  Pr was provided [employer] and community resources for follow up at her discretion.      06/29/25 8867   Service Assessment   Patient Orientation Alert and Oriented   Cognition Alert   History Provided By Medical Record   Primary Caregiver Self   Support Systems Children;Family Members   Patient's Healthcare Decision Maker is: Legal Next of Kin   PCP Verified by CM No   Prior Functional Level Independent in ADLs/IADLs   Current Functional Level Independent in ADLs/IADLs   Can patient return to prior living arrangement Yes   Ability to make needs known: Good   Family able to assist with home care needs: Yes   Would you like for me to discuss the discharge plan with any other family members/significant others, and if so, who? No   Financial Resources None   Community Resources None   CM/SW Referral Other (see comment)  (none)   Services At/After Discharge   Transition of Care Consult (CM Consult) Discharge Planning   Services At/After Discharge None   [place] Resource Information Provided? No   Mode of Transport at Discharge Other (see comment)  (family)   Confirm Follow Up Transport Family   Condition of Participation: Discharge Planning   The Plan for Transition of Care is related to the following treatment goals: Pt will return home with family at her functional baseline and out pt follow up with [facility]/Gynocology.   The Patient and/Or Patient Representative agree with the Discharge Plan? Yes

## 2025-06-30 LAB — HAPTOGLOB SERPL-MCNC: 127 MG/DL (ref 30–200)
